# Patient Record
Sex: FEMALE | Race: WHITE | Employment: OTHER | URBAN - METROPOLITAN AREA
[De-identification: names, ages, dates, MRNs, and addresses within clinical notes are randomized per-mention and may not be internally consistent; named-entity substitution may affect disease eponyms.]

---

## 2022-02-26 ENCOUNTER — APPOINTMENT (OUTPATIENT)
Dept: GENERAL RADIOLOGY | Age: 87
DRG: 511 | End: 2022-02-26
Payer: MEDICARE

## 2022-02-26 ENCOUNTER — APPOINTMENT (OUTPATIENT)
Dept: CT IMAGING | Age: 87
DRG: 511 | End: 2022-02-26
Payer: MEDICARE

## 2022-02-26 ENCOUNTER — HOSPITAL ENCOUNTER (INPATIENT)
Age: 87
LOS: 3 days | Discharge: NURSING, REHAB OR LONG-TERM CARE | DRG: 511 | End: 2022-03-01
Attending: STUDENT IN AN ORGANIZED HEALTH CARE EDUCATION/TRAINING PROGRAM | Admitting: STUDENT IN AN ORGANIZED HEALTH CARE EDUCATION/TRAINING PROGRAM
Payer: MEDICARE

## 2022-02-26 DIAGNOSIS — S62.101B OPEN WRIST FRACTURE, RIGHT, INITIAL ENCOUNTER: Primary | ICD-10-CM

## 2022-02-26 PROBLEM — I10 HTN (HYPERTENSION): Status: ACTIVE | Noted: 2022-02-26

## 2022-02-26 PROBLEM — S62.109A WRIST FRACTURE: Status: ACTIVE | Noted: 2022-02-26

## 2022-02-26 PROBLEM — S62.101A WRIST FRACTURE, CLOSED, RIGHT, INITIAL ENCOUNTER: Status: ACTIVE | Noted: 2022-02-26

## 2022-02-26 LAB
ABO/RH: NORMAL
ANION GAP SERPL CALCULATED.3IONS-SCNC: 11 MMOL/L (ref 3–16)
ANTIBODY SCREEN: NORMAL
BASOPHILS ABSOLUTE: 0 K/UL (ref 0–0.2)
BASOPHILS RELATIVE PERCENT: 0.4 %
BILIRUBIN URINE: NEGATIVE
BLOOD, URINE: NEGATIVE
BUN BLDV-MCNC: 23 MG/DL (ref 7–20)
CALCIUM SERPL-MCNC: 9.7 MG/DL (ref 8.3–10.6)
CHLORIDE BLD-SCNC: 98 MMOL/L (ref 99–110)
CLARITY: CLEAR
CO2: 29 MMOL/L (ref 21–32)
COLOR: YELLOW
CREAT SERPL-MCNC: 0.9 MG/DL (ref 0.6–1.2)
EOSINOPHILS ABSOLUTE: 0 K/UL (ref 0–0.6)
EOSINOPHILS RELATIVE PERCENT: 0.5 %
GFR AFRICAN AMERICAN: >60
GFR NON-AFRICAN AMERICAN: 59
GLUCOSE BLD-MCNC: 129 MG/DL (ref 70–99)
GLUCOSE URINE: NEGATIVE MG/DL
HCT VFR BLD CALC: 39.4 % (ref 36–48)
HEMOGLOBIN: 12.9 G/DL (ref 12–16)
INR BLD: 1.06 (ref 0.88–1.12)
KETONES, URINE: ABNORMAL MG/DL
LEUKOCYTE ESTERASE, URINE: NEGATIVE
LYMPHOCYTES ABSOLUTE: 0.7 K/UL (ref 1–5.1)
LYMPHOCYTES RELATIVE PERCENT: 10.8 %
MCH RBC QN AUTO: 29.6 PG (ref 26–34)
MCHC RBC AUTO-ENTMCNC: 32.7 G/DL (ref 31–36)
MCV RBC AUTO: 90.8 FL (ref 80–100)
MICROSCOPIC EXAMINATION: ABNORMAL
MONOCYTES ABSOLUTE: 0.4 K/UL (ref 0–1.3)
MONOCYTES RELATIVE PERCENT: 7.2 %
NEUTROPHILS ABSOLUTE: 4.9 K/UL (ref 1.7–7.7)
NEUTROPHILS RELATIVE PERCENT: 81.1 %
NITRITE, URINE: NEGATIVE
PDW BLD-RTO: 13.2 % (ref 12.4–15.4)
PH UA: 6.5 (ref 5–8)
PLATELET # BLD: 212 K/UL (ref 135–450)
PMV BLD AUTO: 7.4 FL (ref 5–10.5)
POTASSIUM REFLEX MAGNESIUM: 3.9 MMOL/L (ref 3.5–5.1)
PROTEIN UA: NEGATIVE MG/DL
PROTHROMBIN TIME: 12 SEC (ref 9.9–12.7)
RBC # BLD: 4.34 M/UL (ref 4–5.2)
SARS-COV-2, NAAT: NOT DETECTED
SODIUM BLD-SCNC: 138 MMOL/L (ref 136–145)
SPECIFIC GRAVITY UA: 1.02 (ref 1–1.03)
URINE REFLEX TO CULTURE: ABNORMAL
URINE TYPE: ABNORMAL
UROBILINOGEN, URINE: 1 E.U./DL
WBC # BLD: 6.1 K/UL (ref 4–11)

## 2022-02-26 PROCEDURE — 81003 URINALYSIS AUTO W/O SCOPE: CPT

## 2022-02-26 PROCEDURE — 72192 CT PELVIS W/O DYE: CPT

## 2022-02-26 PROCEDURE — 36415 COLL VENOUS BLD VENIPUNCTURE: CPT

## 2022-02-26 PROCEDURE — 1200000000 HC SEMI PRIVATE

## 2022-02-26 PROCEDURE — 72125 CT NECK SPINE W/O DYE: CPT

## 2022-02-26 PROCEDURE — 80048 BASIC METABOLIC PNL TOTAL CA: CPT

## 2022-02-26 PROCEDURE — 99282 EMERGENCY DEPT VISIT SF MDM: CPT

## 2022-02-26 PROCEDURE — 86901 BLOOD TYPING SEROLOGIC RH(D): CPT

## 2022-02-26 PROCEDURE — 86850 RBC ANTIBODY SCREEN: CPT

## 2022-02-26 PROCEDURE — 96365 THER/PROPH/DIAG IV INF INIT: CPT

## 2022-02-26 PROCEDURE — 6360000002 HC RX W HCPCS: Performed by: PHYSICIAN ASSISTANT

## 2022-02-26 PROCEDURE — 2580000003 HC RX 258: Performed by: PHYSICIAN ASSISTANT

## 2022-02-26 PROCEDURE — 86900 BLOOD TYPING SEROLOGIC ABO: CPT

## 2022-02-26 PROCEDURE — 70450 CT HEAD/BRAIN W/O DYE: CPT

## 2022-02-26 PROCEDURE — 71250 CT THORAX DX C-: CPT

## 2022-02-26 PROCEDURE — 71045 X-RAY EXAM CHEST 1 VIEW: CPT

## 2022-02-26 PROCEDURE — 85025 COMPLETE CBC W/AUTO DIFF WBC: CPT

## 2022-02-26 PROCEDURE — 87635 SARS-COV-2 COVID-19 AMP PRB: CPT

## 2022-02-26 PROCEDURE — 73200 CT UPPER EXTREMITY W/O DYE: CPT

## 2022-02-26 PROCEDURE — 73110 X-RAY EXAM OF WRIST: CPT

## 2022-02-26 PROCEDURE — 93005 ELECTROCARDIOGRAM TRACING: CPT | Performed by: PHYSICIAN ASSISTANT

## 2022-02-26 PROCEDURE — 96375 TX/PRO/DX INJ NEW DRUG ADDON: CPT

## 2022-02-26 PROCEDURE — 85610 PROTHROMBIN TIME: CPT

## 2022-02-26 PROCEDURE — 73090 X-RAY EXAM OF FOREARM: CPT

## 2022-02-26 RX ORDER — METOPROLOL SUCCINATE 50 MG/1
50 TABLET, EXTENDED RELEASE ORAL DAILY
COMMUNITY

## 2022-02-26 RX ORDER — VITAMIN E 268 MG
400 CAPSULE ORAL DAILY
COMMUNITY

## 2022-02-26 RX ORDER — TRIAMTERENE AND HYDROCHLOROTHIAZIDE 37.5; 25 MG/1; MG/1
1 CAPSULE ORAL EVERY MORNING
COMMUNITY

## 2022-02-26 RX ORDER — MORPHINE SULFATE 2 MG/ML
2 INJECTION, SOLUTION INTRAMUSCULAR; INTRAVENOUS ONCE
Status: COMPLETED | OUTPATIENT
Start: 2022-02-26 | End: 2022-02-26

## 2022-02-26 RX ORDER — ONDANSETRON 2 MG/ML
4 INJECTION INTRAMUSCULAR; INTRAVENOUS ONCE
Status: COMPLETED | OUTPATIENT
Start: 2022-02-26 | End: 2022-02-26

## 2022-02-26 RX ORDER — FERROUS SULFATE 325(65) MG
325 TABLET ORAL
COMMUNITY

## 2022-02-26 RX ORDER — PSYLLIUM HUSK 0.4 G
1 CAPSULE ORAL DAILY
COMMUNITY

## 2022-02-26 RX ORDER — ASCORBIC ACID 500 MG
500 TABLET ORAL DAILY
COMMUNITY

## 2022-02-26 RX ORDER — SODIUM CHLORIDE 9 MG/ML
INJECTION, SOLUTION INTRAVENOUS CONTINUOUS
Status: ACTIVE | OUTPATIENT
Start: 2022-02-26 | End: 2022-02-27

## 2022-02-26 RX ORDER — VITAMIN B COMPLEX
1 TABLET ORAL
COMMUNITY

## 2022-02-26 RX ORDER — BRIMONIDINE TARTRATE 2 MG/ML
1 SOLUTION/ DROPS OPHTHALMIC 2 TIMES DAILY
COMMUNITY

## 2022-02-26 RX ADMIN — CEFAZOLIN SODIUM 2000 MG: 10 INJECTION, POWDER, FOR SOLUTION INTRAVENOUS at 20:42

## 2022-02-26 RX ADMIN — ONDANSETRON 4 MG: 2 INJECTION INTRAMUSCULAR; INTRAVENOUS at 19:04

## 2022-02-26 RX ADMIN — MORPHINE SULFATE 2 MG: 2 INJECTION, SOLUTION INTRAMUSCULAR; INTRAVENOUS at 19:03

## 2022-02-26 ASSESSMENT — PAIN - FUNCTIONAL ASSESSMENT: PAIN_FUNCTIONAL_ASSESSMENT: 0-10

## 2022-02-26 ASSESSMENT — PAIN SCALES - GENERAL
PAINLEVEL_OUTOF10: 5
PAINLEVEL_OUTOF10: 8

## 2022-02-26 ASSESSMENT — PAIN DESCRIPTION - ORIENTATION: ORIENTATION: RIGHT

## 2022-02-26 ASSESSMENT — ENCOUNTER SYMPTOMS
ABDOMINAL PAIN: 0
COUGH: 0
SHORTNESS OF BREATH: 0
VOMITING: 0
NAUSEA: 0
DIARRHEA: 0

## 2022-02-26 ASSESSMENT — PAIN DESCRIPTION - PAIN TYPE: TYPE: ACUTE PAIN

## 2022-02-26 ASSESSMENT — PAIN DESCRIPTION - LOCATION: LOCATION: WRIST

## 2022-02-26 ASSESSMENT — PAIN DESCRIPTION - DESCRIPTORS: DESCRIPTORS: DISCOMFORT

## 2022-02-26 NOTE — ED PROVIDER NOTES
[address]      Pt Name: Meena Mcgraw  MRN: [id number]  [place] 10/17/1930  Date of evaluation: 2/26/2022  Provider: Caryle Dixon, PA    This patient was not seen and evaluated by the attending physician No att. providers found. CHIEF COMPLAINT       Chief Complaint   Patient presents with    Wrist Injury     right wrist,        CRITICAL CARE TIME   I performed a total Critical Care time of  35 minutes, excluding separately reportable procedures. There was a high probability of clinically significant/life threatening deterioration in the patient's condition which required my urgent intervention. Not limited to multiple reexaminations, discussions with attending physician and consultants. HISTORY OF PRESENT ILLNESS  (Location/Symptom, Timing/Onset, Context/Setting, Quality, Duration, Modifying Factors, Severity.)   Meena Mcgraw is a 80 y.o. female who presents to the emergency department after a fall. She is accompanied by her daughter. The patient tells me that she was in the kitchen putting some silver back in the fridge when she lost her balance and fell onto her right side. She hit her head. Denies loss of consciousness or vomiting. She complains of right wrist pain with a wound on the pinky side of the wrist.  She had surgery on this wrist in the past, she tells me it was years ago at [facility] and is unsure who performed the surgery. She does not take any blood thinners or aspirin. Tells me she has a past medical history of hypertension and otherwise denies chronic medical problems. She tells me she has not had any illness recently denies fevers, vomiting or diarrhea. Denies pain in her shoulders or hips or neck. Nursing Notes were reviewed and I agree. REVIEW OF SYSTEMS    (2-9 systems for level 4, 10 or more for level 5)     Review of Systems   Constitutional: Negative for fever.    Respiratory: Negative for cough and shortness of breath. Cardiovascular: Negative for chest pain. Gastrointestinal: Negative for abdominal pain, diarrhea, nausea and vomiting. Musculoskeletal: Positive for arthralgias and joint swelling. Negative for neck pain. Skin: Positive for wound. Neurological: Negative for weakness, numbness and headaches. Psychiatric/Behavioral: Negative for agitation. Except as noted above the remainder of the review of systems was reviewed and negative. PAST MEDICAL HISTORY         Diagnosis Date    Hypertension        SURGICAL HISTORY           Procedure Laterality Date    FOREARM SURGERY Right 2/27/2022    RADIUS OPEN REDUCTION INTERNAL FIXATION performed by Kailey Sullivan MD at [address] [facility],[address]       Discharge Medication List as of 3/1/2022  7:00 PM      CONTINUE these medications which have NOT CHANGED    Details   brimonidine (ALPHAGAN) 0.2 % ophthalmic solution 1 drop in the morning and at bedtimeHistorical Med      metoprolol succinate (TOPROL XL) 50 MG extended release tablet Take 50 mg by mouth dailyHistorical Med      triamterene-hydroCHLOROthiazide (DYAZIDE) 37.5-25 MG per capsule Take 1 capsule by mouth every morningHistorical Med      ferrous sulfate (IRON 325) 325 (65 Fe) MG tablet Take 325 mg by mouth daily (with breakfast)Historical Med      Calcium Carb-Cholecalciferol (CALCIUM 1000 + D) 1000-800 MG-UNIT TABS Take 1 tablet by mouth dailyHistorical Med      vitamin D (CHOLECALCIFEROL) 25 MCG (1000 UT) TABS tablet Take 2,000 Units by mouth every eveningHistorical Med      vitamin C (ASCORBIC ACID) 500 MG tablet Take 500 mg by mouth dailyHistorical Med      vitamin E 400 UNIT capsule Take 400 Units by mouth dailyHistorical Med      B Complex Vitamins (B-COMPLEX/B-12) TABS Take 1 tablet by mouth every morning (before breakfast)Historical Med             ALLERGIES     Patient has no known allergies. FAMILY HISTORY     History reviewed.  No pertinent family history. No family status information on file. SOCIAL HISTORY      reports that she has never smoked. She has never used smokeless tobacco. She reports that she does not drink alcohol and does not use drugs. PHYSICAL EXAM    (up to 7 for level 4, 8 or more for level 5)     ED Triage Vitals [02/26/22 1724]   BP Temp Temp Source Pulse Resp SpO2 Height Weight   (!) 161/83 97.9 °F (36.6 °C) Oral 97 16 97 % -- --       Physical Exam  Vitals and nursing note reviewed. Constitutional:       Appearance: Normal appearance. HENT:      Head: Normocephalic and atraumatic. Mouth/Throat:      Mouth: Mucous membranes are moist.   Eyes:      Pupils: Pupils are equal, round, and reactive to light. Cardiovascular:      Rate and Rhythm: Normal rate. Pulses: Normal pulses. Pulmonary:      Effort: Pulmonary effort is normal. No respiratory distress. Abdominal:      Tenderness: There is no abdominal tenderness. Musculoskeletal:      Right forearm: Deformity, laceration, tenderness and bony tenderness present. Hands:       Cervical back: Normal range of motion. No tenderness. Skin:     General: Skin is warm. Neurological:      General: No focal deficit present. Mental Status: She is alert and oriented to person, place, and time. Psychiatric:         Mood and Affect: Mood normal.         Behavior: Behavior normal.         DIAGNOSTIC RESULTS     EKG: All EKG's are interpreted by DAO Louis in the absence of a cardiologist.    EKG interpreted by myself - please refer to attending physician's note for complete EKG interpretation:    No evidence of acute ischemia or injury. RADIOLOGY:   Non-plain film images such as CT, Ultrasound and MRI are read by the radiologist. Plain radiographic images are visualized and preliminarily interpreted by DAO Louis with the below findings:    Reviewed radiologist's interpretation.      Interpretation per the Radiologist below, if available at the time of this note:    XR WRIST RIGHT (MIN 3 VIEWS)   Final Result      FLUORO FOR SURGICAL PROCEDURES   Final Result      CT CHEST WO CONTRAST   Final Result   No acute abnormality identified. The opacity seen in the right perihilar region is most likely secondary to   superimposition of dense breast parenchyma with the right hilum. Small noncalcified nodules are identified within the very anterior aspect of   the right lower lobe. No routine follow-up is recommended based upon the   most recent recommendations. See full recommendations below. RECOMMENDATIONS:   Multiple pulmonary nodules. Most severe: 5 mm right solid pulmonary nodule. No routine follow-up imaging is recommended per Fleischner Society Guidelines. These guidelines do not apply to immunocompromised patients and patients with   cancer. Follow up in patients with significant comorbidities as clinically   warranted. For lung cancer screening, adhere to Lung-RADS guidelines. Reference: Radiology. 2017; 284(1):228-43. XR WRIST RIGHT (MIN 3 VIEWS)   Final Result   Interval partial reduction of the distal right radial and ulnar fractures. XR CHEST 1 VIEW   Final Result   Right perihilar opacity. Correlate with presentation. CT WRIST RIGHT WO CONTRAST   Final Result   1. Acute and displaced fractures of the distal radius and distal ulna with   impaction at the fracture site and pronounced malalignment of the distal   ulnar fracture and distal radioulnar joint. 2. Soft tissue edema and subcutaneous gas compatible with open fracture. 3. Severe osteopenia. 4. 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   5. Chondrocalcinosis. CT HEAD WO CONTRAST   Final Result   No acute intracranial abnormality. Specifically, no acute intracranial   hemorrhage or mass effect. Chronic small vessel ischemic disease.          CT PELVIS WO CONTRAST Additional Contrast? None   Final Result   No acute fracture is identified with the pelvis and within the hips. Amorphous calcification adjacent the right greater trochanter, which raise   the possibility of calcium hydroxyapatite deposition, which can be a   generator of pain. CT CERVICAL SPINE WO CONTRAST   Final Result   No acute fracture or traumatic malalignment. Basilar invagination of the dens, with some erosions seen at the atlantoaxial   joint, which raises the possibility of an erosive arthropathy. XR RADIUS ULNA RIGHT (2 VIEWS)   Final Result   Acute displaced distal radius and ulnar fractures.                LABS:  Labs Reviewed   CBC WITH AUTO DIFFERENTIAL - Abnormal; Notable for the following components:       Result Value    Lymphocytes Absolute 0.7 (*)     All other components within normal limits    Narrative:     Performed at:  [address] [facility]   Phone [phone]   BASIC METABOLIC PANEL W/ REFLEX TO MG FOR LOW K - Abnormal; Notable for the following components:    Chloride 98 (*)     Glucose 129 (*)     BUN 23 (*)     GFR Non- 59 (*)     All other components within normal limits    Narrative:     Performed at:  [address] [place] [address]   Phone [phone]   URINALYSIS WITH REFLEX TO CULTURE - Abnormal; Notable for the following components:    Ketones, Urine TRACE (*)     All other components within normal limits    Narrative:     Performed at:  [address] [place] [address]   Phone [phone]   CBC WITH AUTO DIFFERENTIAL - Abnormal; Notable for the following components:    RBC 3.95 (*)     Hemoglobin 11.8 (*)     Hematocrit 35.9 (*)     All other components within normal limits    Narrative:     Performed at:  [address] [place] [address]   Phone [phone]   BASIC METABOLIC PANEL - Abnormal; Notable for the following components:    Glucose 110 (*)     All other components within normal limits    Narrative:     Performed at:  [address] [place]   Phone [phone]   MAGNESIUM - Abnormal; Notable for the following components:    Magnesium 1.40 (*)     All other components within normal limits    Narrative:     Performed at:  [address] [place] [address]   Phone [phone]   CBC WITH AUTO DIFFERENTIAL - Abnormal; Notable for the following components:    Hemoglobin 11.1 (*)     Hematocrit 33.8 (*)     RBC 3.71 (*)     All other components within normal limits    Narrative:     Performed at:  [address] [place]   Phone [phone]   BASIC METABOLIC PANEL - Abnormal; Notable for the following components:    Sodium 133 (*)     Chloride 98 (*)     Glucose 102 (*)     All other components within normal limits    Narrative:     Performed at:  [address] [place]   Phone [phone]   CBC WITH AUTO DIFFERENTIAL - Abnormal; Notable for the following components:    RBC 3.74 (*)     Hemoglobin 11.2 (*)     Hematocrit 33.9 (*)     All other components within normal limits    Narrative:     Performed at:  [address] [facility] [place]   Phone [phone]   BASIC METABOLIC PANEL - Abnormal; Notable for the following components:    Sodium 132 (*)     Chloride 96 (*)     Glucose 101 (*)     All other components within normal limits    Narrative:     Performed at:  [address] [facility] [place]   Phone [phone] RAPID    Narrative:     Performed at:  [facility] [address] [facility]  [address]  [place], [place] [address]   Phone [phone], RAPID    Narrative:     Performed at:  [address] [facility]  [address]  [place] [address]   Phone [phone]   PROTIME-INR    Narrative:     Performed at:  [address] [facility]  [address] [facility] [place], [place] [address]   Phone [phone]   MAGNESIUM    Narrative:     Performed at:  [address] [facility]  [address] [facility] [place], [place] [address]   Phone [phone]   MAGNESIUM    Narrative:     Performed at:  [address] [facility]  [address] [facility] [place] [facility] [place] [address]   Phone [phone]   TYPE AND SCREEN    Narrative:     Performed at:  [address] [facility]  [address] [facility] [place], [place] [address]   Phone [phone]       All other labs were within normal range or not returned as of this dictation. EMERGENCY DEPARTMENT COURSE and DIFFERENTIAL DIAGNOSIS/MDM:   Vitals:    Vitals:    02/28/22 1646 02/28/22 1921 03/01/22 0703 03/01/22 1419   BP: (!) 144/71 (!) 142/70  (!) 143/78   Pulse: 108 96  99   Resp: 18 17  18   Temp: 100 °F (37.8 °C) 99.4 °F (37.4 °C)  99.2 °F (37.3 °C)   TempSrc: Oral Oral  Oral   SpO2: 94% 95%  97%   Weight:   95 lb 0.3 oz (43.1 kg)    Height:         Patient is afebrile not tachycardic not hypoxic blood pressure elevated 797 systolic. She has a quarter sized wound on the ulnar aspect of the right wrist over fracture fragment. It was cleaned irrigated with some normal saline and a wet dressing of Betadine was applied per recommendation of orthopedics. I spoke with both Dr. Dara Baxter and Dr. Jovanny Steel. She will have surgery at 10:30 AM.    CONSULTS:  IP CONSULT TO ORTHOPEDIC SURGERY  IP CONSULT TO ORTHOPEDIC SURGERY  IP CONSULT TO SPIRITUAL SERVICES  IP CONSULT TO SOCIAL WORK    PROCEDURES:  Procedures      FINAL IMPRESSION      1.  Open

## 2022-02-27 ENCOUNTER — APPOINTMENT (OUTPATIENT)
Dept: GENERAL RADIOLOGY | Age: 87
DRG: 511 | End: 2022-02-27
Payer: MEDICARE

## 2022-02-27 ENCOUNTER — ANESTHESIA (OUTPATIENT)
Dept: OPERATING ROOM | Age: 87
DRG: 511 | End: 2022-02-27
Payer: MEDICARE

## 2022-02-27 ENCOUNTER — ANESTHESIA EVENT (OUTPATIENT)
Dept: OPERATING ROOM | Age: 87
DRG: 511 | End: 2022-02-27
Payer: MEDICARE

## 2022-02-27 VITALS
OXYGEN SATURATION: 100 % | SYSTOLIC BLOOD PRESSURE: 140 MMHG | DIASTOLIC BLOOD PRESSURE: 73 MMHG | RESPIRATION RATE: 19 BRPM

## 2022-02-27 LAB
ANION GAP SERPL CALCULATED.3IONS-SCNC: 10 MMOL/L (ref 3–16)
BASOPHILS ABSOLUTE: 0 K/UL (ref 0–0.2)
BASOPHILS RELATIVE PERCENT: 0.5 %
BUN BLDV-MCNC: 18 MG/DL (ref 7–20)
CALCIUM SERPL-MCNC: 8.8 MG/DL (ref 8.3–10.6)
CHLORIDE BLD-SCNC: 101 MMOL/L (ref 99–110)
CO2: 26 MMOL/L (ref 21–32)
CREAT SERPL-MCNC: 0.7 MG/DL (ref 0.6–1.2)
EKG ATRIAL RATE: 97 BPM
EKG DIAGNOSIS: NORMAL
EKG P AXIS: 86 DEGREES
EKG P-R INTERVAL: 176 MS
EKG Q-T INTERVAL: 344 MS
EKG QRS DURATION: 70 MS
EKG QTC CALCULATION (BAZETT): 436 MS
EKG R AXIS: 55 DEGREES
EKG T AXIS: 80 DEGREES
EKG VENTRICULAR RATE: 97 BPM
EOSINOPHILS ABSOLUTE: 0 K/UL (ref 0–0.6)
EOSINOPHILS RELATIVE PERCENT: 1 %
GFR AFRICAN AMERICAN: >60
GFR NON-AFRICAN AMERICAN: >60
GLUCOSE BLD-MCNC: 110 MG/DL (ref 70–99)
HCT VFR BLD CALC: 35.9 % (ref 36–48)
HEMOGLOBIN: 11.8 G/DL (ref 12–16)
LYMPHOCYTES ABSOLUTE: 1 K/UL (ref 1–5.1)
LYMPHOCYTES RELATIVE PERCENT: 21.6 %
MAGNESIUM: 1.4 MG/DL (ref 1.8–2.4)
MCH RBC QN AUTO: 29.8 PG (ref 26–34)
MCHC RBC AUTO-ENTMCNC: 32.8 G/DL (ref 31–36)
MCV RBC AUTO: 90.7 FL (ref 80–100)
MONOCYTES ABSOLUTE: 0.4 K/UL (ref 0–1.3)
MONOCYTES RELATIVE PERCENT: 8.4 %
NEUTROPHILS ABSOLUTE: 3.3 K/UL (ref 1.7–7.7)
NEUTROPHILS RELATIVE PERCENT: 68.5 %
PDW BLD-RTO: 13.1 % (ref 12.4–15.4)
PLATELET # BLD: 192 K/UL (ref 135–450)
PMV BLD AUTO: 7.4 FL (ref 5–10.5)
POTASSIUM SERPL-SCNC: 4 MMOL/L (ref 3.5–5.1)
RBC # BLD: 3.95 M/UL (ref 4–5.2)
SODIUM BLD-SCNC: 137 MMOL/L (ref 136–145)
WBC # BLD: 4.8 K/UL (ref 4–11)

## 2022-02-27 PROCEDURE — 3209999900 FLUORO FOR SURGICAL PROCEDURES

## 2022-02-27 PROCEDURE — 83735 ASSAY OF MAGNESIUM: CPT

## 2022-02-27 PROCEDURE — 93010 ELECTROCARDIOGRAM REPORT: CPT | Performed by: INTERNAL MEDICINE

## 2022-02-27 PROCEDURE — A4217 STERILE WATER/SALINE, 500 ML: HCPCS | Performed by: ORTHOPAEDIC SURGERY

## 2022-02-27 PROCEDURE — 2580000003 HC RX 258: Performed by: ORTHOPAEDIC SURGERY

## 2022-02-27 PROCEDURE — 85025 COMPLETE CBC W/AUTO DIFF WBC: CPT

## 2022-02-27 PROCEDURE — 2709999900 HC NON-CHARGEABLE SUPPLY: Performed by: ORTHOPAEDIC SURGERY

## 2022-02-27 PROCEDURE — C1769 GUIDE WIRE: HCPCS | Performed by: ORTHOPAEDIC SURGERY

## 2022-02-27 PROCEDURE — 36415 COLL VENOUS BLD VENIPUNCTURE: CPT

## 2022-02-27 PROCEDURE — 7100000000 HC PACU RECOVERY - FIRST 15 MIN: Performed by: ORTHOPAEDIC SURGERY

## 2022-02-27 PROCEDURE — 3600000014 HC SURGERY LEVEL 4 ADDTL 15MIN: Performed by: ORTHOPAEDIC SURGERY

## 2022-02-27 PROCEDURE — 1200000000 HC SEMI PRIVATE

## 2022-02-27 PROCEDURE — 2580000003 HC RX 258: Performed by: STUDENT IN AN ORGANIZED HEALTH CARE EDUCATION/TRAINING PROGRAM

## 2022-02-27 PROCEDURE — 99222 1ST HOSP IP/OBS MODERATE 55: CPT | Performed by: ORTHOPAEDIC SURGERY

## 2022-02-27 PROCEDURE — C1713 ANCHOR/SCREW BN/BN,TIS/BN: HCPCS | Performed by: ORTHOPAEDIC SURGERY

## 2022-02-27 PROCEDURE — 80048 BASIC METABOLIC PNL TOTAL CA: CPT

## 2022-02-27 PROCEDURE — 94150 VITAL CAPACITY TEST: CPT

## 2022-02-27 PROCEDURE — 3700000001 HC ADD 15 MINUTES (ANESTHESIA): Performed by: ORTHOPAEDIC SURGERY

## 2022-02-27 PROCEDURE — 73110 X-RAY EXAM OF WRIST: CPT

## 2022-02-27 PROCEDURE — 6360000002 HC RX W HCPCS: Performed by: ORTHOPAEDIC SURGERY

## 2022-02-27 PROCEDURE — 2500000003 HC RX 250 WO HCPCS: Performed by: ORTHOPAEDIC SURGERY

## 2022-02-27 PROCEDURE — 6360000002 HC RX W HCPCS: Performed by: STUDENT IN AN ORGANIZED HEALTH CARE EDUCATION/TRAINING PROGRAM

## 2022-02-27 PROCEDURE — 25525 OPTX RDL SHFT FX&CLTX RAD/UL: CPT | Performed by: ORTHOPAEDIC SURGERY

## 2022-02-27 PROCEDURE — 3600000004 HC SURGERY LEVEL 4 BASE: Performed by: ORTHOPAEDIC SURGERY

## 2022-02-27 PROCEDURE — 2720000010 HC SURG SUPPLY STERILE: Performed by: ORTHOPAEDIC SURGERY

## 2022-02-27 PROCEDURE — 3700000000 HC ANESTHESIA ATTENDED CARE: Performed by: ORTHOPAEDIC SURGERY

## 2022-02-27 PROCEDURE — 6370000000 HC RX 637 (ALT 250 FOR IP): Performed by: ORTHOPAEDIC SURGERY

## 2022-02-27 PROCEDURE — 6360000002 HC RX W HCPCS: Performed by: ANESTHESIOLOGY

## 2022-02-27 PROCEDURE — 0PSH04Z REPOSITION RIGHT RADIUS WITH INTERNAL FIXATION DEVICE, OPEN APPROACH: ICD-10-PCS | Performed by: ORTHOPAEDIC SURGERY

## 2022-02-27 PROCEDURE — 11012 DEB SKIN BONE AT FX SITE: CPT | Performed by: ORTHOPAEDIC SURGERY

## 2022-02-27 PROCEDURE — 2500000003 HC RX 250 WO HCPCS: Performed by: ANESTHESIOLOGY

## 2022-02-27 PROCEDURE — 7100000001 HC PACU RECOVERY - ADDTL 15 MIN: Performed by: ORTHOPAEDIC SURGERY

## 2022-02-27 DEVICE — LOCKING SCREW, FULLY THREADED,T8
Type: IMPLANTABLE DEVICE | Site: WRIST | Status: FUNCTIONAL
Brand: VARIAX

## 2022-02-27 DEVICE — BONE SCREW, FULLY THREADED, T8
Type: IMPLANTABLE DEVICE | Site: WRIST | Status: FUNCTIONAL
Brand: VARIAX

## 2022-02-27 DEVICE — BROAD LOCKING PLATE, 2.7
Type: IMPLANTABLE DEVICE | Site: WRIST | Status: FUNCTIONAL
Brand: VARIAX

## 2022-02-27 RX ORDER — MEPERIDINE HYDROCHLORIDE 25 MG/ML
12.5 INJECTION INTRAMUSCULAR; INTRAVENOUS; SUBCUTANEOUS EVERY 5 MIN PRN
Status: DISCONTINUED | OUTPATIENT
Start: 2022-02-27 | End: 2022-02-27 | Stop reason: HOSPADM

## 2022-02-27 RX ORDER — DEXAMETHASONE SODIUM PHOSPHATE 4 MG/ML
INJECTION, SOLUTION INTRA-ARTICULAR; INTRALESIONAL; INTRAMUSCULAR; INTRAVENOUS; SOFT TISSUE PRN
Status: DISCONTINUED | OUTPATIENT
Start: 2022-02-27 | End: 2022-02-27 | Stop reason: SDUPTHER

## 2022-02-27 RX ORDER — SODIUM CHLORIDE 0.9 % (FLUSH) 0.9 %
5-40 SYRINGE (ML) INJECTION PRN
Status: DISCONTINUED | OUTPATIENT
Start: 2022-02-27 | End: 2022-02-27 | Stop reason: SDUPTHER

## 2022-02-27 RX ORDER — SODIUM CHLORIDE 9 MG/ML
25 INJECTION, SOLUTION INTRAVENOUS PRN
Status: DISCONTINUED | OUTPATIENT
Start: 2022-02-27 | End: 2022-02-27 | Stop reason: SDUPTHER

## 2022-02-27 RX ORDER — SODIUM CHLORIDE 0.9 % (FLUSH) 0.9 %
5-40 SYRINGE (ML) INJECTION PRN
Status: DISCONTINUED | OUTPATIENT
Start: 2022-02-27 | End: 2022-03-01 | Stop reason: HOSPADM

## 2022-02-27 RX ORDER — OXYCODONE HYDROCHLORIDE 5 MG/1
5 TABLET ORAL PRN
Status: DISCONTINUED | OUTPATIENT
Start: 2022-02-27 | End: 2022-02-27 | Stop reason: HOSPADM

## 2022-02-27 RX ORDER — ONDANSETRON 2 MG/ML
4 INJECTION INTRAMUSCULAR; INTRAVENOUS EVERY 6 HOURS PRN
Status: DISCONTINUED | OUTPATIENT
Start: 2022-02-27 | End: 2022-03-01 | Stop reason: HOSPADM

## 2022-02-27 RX ORDER — PROPOFOL 10 MG/ML
INJECTION, EMULSION INTRAVENOUS PRN
Status: DISCONTINUED | OUTPATIENT
Start: 2022-02-27 | End: 2022-02-27 | Stop reason: SDUPTHER

## 2022-02-27 RX ORDER — DIPHENHYDRAMINE HYDROCHLORIDE 50 MG/ML
12.5 INJECTION INTRAMUSCULAR; INTRAVENOUS
Status: DISCONTINUED | OUTPATIENT
Start: 2022-02-27 | End: 2022-02-27 | Stop reason: HOSPADM

## 2022-02-27 RX ORDER — FENTANYL CITRATE 50 UG/ML
INJECTION, SOLUTION INTRAMUSCULAR; INTRAVENOUS PRN
Status: DISCONTINUED | OUTPATIENT
Start: 2022-02-27 | End: 2022-02-27 | Stop reason: SDUPTHER

## 2022-02-27 RX ORDER — SODIUM CHLORIDE 9 MG/ML
25 INJECTION, SOLUTION INTRAVENOUS PRN
Status: DISCONTINUED | OUTPATIENT
Start: 2022-02-27 | End: 2022-03-01 | Stop reason: HOSPADM

## 2022-02-27 RX ORDER — ONDANSETRON 2 MG/ML
INJECTION INTRAMUSCULAR; INTRAVENOUS PRN
Status: DISCONTINUED | OUTPATIENT
Start: 2022-02-27 | End: 2022-02-27 | Stop reason: SDUPTHER

## 2022-02-27 RX ORDER — SODIUM CHLORIDE 0.9 % (FLUSH) 0.9 %
5-40 SYRINGE (ML) INJECTION EVERY 12 HOURS SCHEDULED
Status: DISCONTINUED | OUTPATIENT
Start: 2022-02-27 | End: 2022-03-01 | Stop reason: HOSPADM

## 2022-02-27 RX ORDER — ONDANSETRON 2 MG/ML
4 INJECTION INTRAMUSCULAR; INTRAVENOUS
Status: DISCONTINUED | OUTPATIENT
Start: 2022-02-27 | End: 2022-02-27 | Stop reason: HOSPADM

## 2022-02-27 RX ORDER — MORPHINE SULFATE 2 MG/ML
2 INJECTION, SOLUTION INTRAMUSCULAR; INTRAVENOUS EVERY 4 HOURS PRN
Status: DISCONTINUED | OUTPATIENT
Start: 2022-02-27 | End: 2022-03-01 | Stop reason: HOSPADM

## 2022-02-27 RX ORDER — FENTANYL CITRATE 50 UG/ML
25 INJECTION, SOLUTION INTRAMUSCULAR; INTRAVENOUS EVERY 5 MIN PRN
Status: DISCONTINUED | OUTPATIENT
Start: 2022-02-27 | End: 2022-02-27 | Stop reason: HOSPADM

## 2022-02-27 RX ORDER — SODIUM CHLORIDE 450 MG/100ML
INJECTION, SOLUTION INTRAVENOUS CONTINUOUS
Status: DISCONTINUED | OUTPATIENT
Start: 2022-02-27 | End: 2022-03-01 | Stop reason: HOSPADM

## 2022-02-27 RX ORDER — BUPIVACAINE HYDROCHLORIDE 5 MG/ML
INJECTION, SOLUTION EPIDURAL; INTRACAUDAL
Status: COMPLETED | OUTPATIENT
Start: 2022-02-27 | End: 2022-02-27

## 2022-02-27 RX ORDER — SODIUM CHLORIDE 0.9 % (FLUSH) 0.9 %
5-40 SYRINGE (ML) INJECTION EVERY 12 HOURS SCHEDULED
Status: DISCONTINUED | OUTPATIENT
Start: 2022-02-27 | End: 2022-02-27 | Stop reason: HOSPADM

## 2022-02-27 RX ORDER — SODIUM CHLORIDE 0.9 % (FLUSH) 0.9 %
5-40 SYRINGE (ML) INJECTION PRN
Status: DISCONTINUED | OUTPATIENT
Start: 2022-02-27 | End: 2022-02-27 | Stop reason: HOSPADM

## 2022-02-27 RX ORDER — OXYCODONE HYDROCHLORIDE 10 MG/1
10 TABLET ORAL PRN
Status: DISCONTINUED | OUTPATIENT
Start: 2022-02-27 | End: 2022-02-27 | Stop reason: HOSPADM

## 2022-02-27 RX ORDER — LIDOCAINE HYDROCHLORIDE 20 MG/ML
INJECTION, SOLUTION EPIDURAL; INFILTRATION; INTRACAUDAL; PERINEURAL PRN
Status: DISCONTINUED | OUTPATIENT
Start: 2022-02-27 | End: 2022-02-27 | Stop reason: SDUPTHER

## 2022-02-27 RX ORDER — SODIUM CHLORIDE 9 MG/ML
25 INJECTION, SOLUTION INTRAVENOUS PRN
Status: DISCONTINUED | OUTPATIENT
Start: 2022-02-27 | End: 2022-02-27 | Stop reason: HOSPADM

## 2022-02-27 RX ORDER — ACETAMINOPHEN 325 MG/1
650 TABLET ORAL EVERY 6 HOURS PRN
Status: DISCONTINUED | OUTPATIENT
Start: 2022-02-27 | End: 2022-03-01 | Stop reason: HOSPADM

## 2022-02-27 RX ORDER — SODIUM CHLORIDE 0.9 % (FLUSH) 0.9 %
5-40 SYRINGE (ML) INJECTION EVERY 12 HOURS SCHEDULED
Status: DISCONTINUED | OUTPATIENT
Start: 2022-02-27 | End: 2022-02-27 | Stop reason: SDUPTHER

## 2022-02-27 RX ORDER — ACETAMINOPHEN 650 MG/1
650 SUPPOSITORY RECTAL EVERY 6 HOURS PRN
Status: DISCONTINUED | OUTPATIENT
Start: 2022-02-27 | End: 2022-03-01 | Stop reason: HOSPADM

## 2022-02-27 RX ORDER — MAGNESIUM SULFATE IN WATER 40 MG/ML
4000 INJECTION, SOLUTION INTRAVENOUS ONCE
Status: COMPLETED | OUTPATIENT
Start: 2022-02-27 | End: 2022-02-27

## 2022-02-27 RX ADMIN — FENTANYL CITRATE 25 MCG: 50 INJECTION, SOLUTION INTRAMUSCULAR; INTRAVENOUS at 11:17

## 2022-02-27 RX ADMIN — CEFAZOLIN 2000 MG: 10 INJECTION, POWDER, FOR SOLUTION INTRAVENOUS at 23:42

## 2022-02-27 RX ADMIN — CEFAZOLIN 2000 MG: 10 INJECTION, POWDER, FOR SOLUTION INTRAVENOUS at 17:45

## 2022-02-27 RX ADMIN — LIDOCAINE HYDROCHLORIDE 60 MG: 20 INJECTION, SOLUTION EPIDURAL; INFILTRATION; INTRACAUDAL; PERINEURAL at 10:59

## 2022-02-27 RX ADMIN — PROPOFOL 20 MG: 10 INJECTION, EMULSION INTRAVENOUS at 11:00

## 2022-02-27 RX ADMIN — SODIUM CHLORIDE: 4.5 INJECTION, SOLUTION INTRAVENOUS at 13:31

## 2022-02-27 RX ADMIN — SODIUM CHLORIDE: 9 INJECTION, SOLUTION INTRAVENOUS at 01:15

## 2022-02-27 RX ADMIN — Medication 10 ML: at 21:04

## 2022-02-27 RX ADMIN — MAGNESIUM SULFATE HEPTAHYDRATE 4000 MG: 40 INJECTION, SOLUTION INTRAVENOUS at 07:22

## 2022-02-27 RX ADMIN — SODIUM CHLORIDE: 9 INJECTION, SOLUTION INTRAVENOUS at 12:25

## 2022-02-27 RX ADMIN — FENTANYL CITRATE 25 MCG: 50 INJECTION, SOLUTION INTRAMUSCULAR; INTRAVENOUS at 11:12

## 2022-02-27 RX ADMIN — ONDANSETRON 2 MG: 2 INJECTION INTRAMUSCULAR; INTRAVENOUS at 12:10

## 2022-02-27 RX ADMIN — PROPOFOL 40 MG: 10 INJECTION, EMULSION INTRAVENOUS at 10:59

## 2022-02-27 RX ADMIN — FENTANYL CITRATE 25 MCG: 50 INJECTION, SOLUTION INTRAMUSCULAR; INTRAVENOUS at 11:07

## 2022-02-27 RX ADMIN — DEXAMETHASONE SODIUM PHOSPHATE 4 MG: 4 INJECTION, SOLUTION INTRAMUSCULAR; INTRAVENOUS at 11:05

## 2022-02-27 RX ADMIN — FENTANYL CITRATE 25 MCG: 50 INJECTION, SOLUTION INTRAMUSCULAR; INTRAVENOUS at 10:59

## 2022-02-27 RX ADMIN — ACETAMINOPHEN 650 MG: 325 TABLET ORAL at 13:34

## 2022-02-27 RX ADMIN — CEFAZOLIN 2000 MG: 10 INJECTION, POWDER, FOR SOLUTION INTRAVENOUS at 06:04

## 2022-02-27 RX ADMIN — ONDANSETRON 2 MG: 2 INJECTION INTRAMUSCULAR; INTRAVENOUS at 11:05

## 2022-02-27 ASSESSMENT — PULMONARY FUNCTION TESTS
PIF_VALUE: 2
PIF_VALUE: 3
PIF_VALUE: 2
PIF_VALUE: 1
PIF_VALUE: 2
PIF_VALUE: 3
PIF_VALUE: 2
PIF_VALUE: 3
PIF_VALUE: 0
PIF_VALUE: 2
PIF_VALUE: 3
PIF_VALUE: 2
PIF_VALUE: 3
PIF_VALUE: 2
PIF_VALUE: 3
PIF_VALUE: 2
PIF_VALUE: 0
PIF_VALUE: 1
PIF_VALUE: 1
PIF_VALUE: 2
PIF_VALUE: 3
PIF_VALUE: 2
PIF_VALUE: 2
PIF_VALUE: 4
PIF_VALUE: 2
PIF_VALUE: 3
PIF_VALUE: 2
PIF_VALUE: 0
PIF_VALUE: 2

## 2022-02-27 ASSESSMENT — PAIN DESCRIPTION - DESCRIPTORS
DESCRIPTORS: ACHING
DESCRIPTORS: HEAVINESS;ACHING
DESCRIPTORS: ACHING

## 2022-02-27 ASSESSMENT — PAIN SCALES - GENERAL
PAINLEVEL_OUTOF10: 0
PAINLEVEL_OUTOF10: 3
PAINLEVEL_OUTOF10: 2

## 2022-02-27 ASSESSMENT — PAIN - FUNCTIONAL ASSESSMENT
PAIN_FUNCTIONAL_ASSESSMENT: PREVENTS OR INTERFERES SOME ACTIVE ACTIVITIES AND ADLS
PAIN_FUNCTIONAL_ASSESSMENT: 0-10
PAIN_FUNCTIONAL_ASSESSMENT: PREVENTS OR INTERFERES SOME ACTIVE ACTIVITIES AND ADLS

## 2022-02-27 ASSESSMENT — PAIN DESCRIPTION - ONSET
ONSET: GRADUAL
ONSET: ON-GOING

## 2022-02-27 ASSESSMENT — PAIN DESCRIPTION - PAIN TYPE
TYPE: SURGICAL PAIN
TYPE: CHRONIC PAIN

## 2022-02-27 ASSESSMENT — PAIN DESCRIPTION - PROGRESSION
CLINICAL_PROGRESSION: NOT CHANGED
CLINICAL_PROGRESSION: NOT CHANGED

## 2022-02-27 ASSESSMENT — PAIN DESCRIPTION - LOCATION
LOCATION: ARM;WRIST
LOCATION: KNEE

## 2022-02-27 ASSESSMENT — PAIN DESCRIPTION - ORIENTATION
ORIENTATION: RIGHT
ORIENTATION: RIGHT

## 2022-02-27 ASSESSMENT — PAIN DESCRIPTION - FREQUENCY
FREQUENCY: CONTINUOUS
FREQUENCY: CONTINUOUS

## 2022-02-27 NOTE — BRIEF OP NOTE
Brief Postoperative Note      Patient: Mayelin Hdez  YOB: 1930  MRN: [id number]    Date of Procedure: 2/27/2022    Pre-Op Diagnosis: OPEN FRACTURE RIGHT DISTAL RADIUS PERIPROSTHETIC GALEAZZI FRACTURE DISLOCATION. Post-Op Diagnosis: Same       Procedure(s):  RADIUS OPEN REDUCTION INTERNAL FIXATION RIGHT DISTAL RADIUS PERIPROSTHETIC GALEAZZI FRACTURE DISLOCATION AND DÉBRIDEMENT OPEN FRACTURE. Surgeon(s):  Vito Sloan MD    Assistant:  Surgical Assistant: Anna West    Anesthesia: General    Estimated Blood Loss (mL): Minimal    Complications: None    Specimens:   * No specimens in log *    Implants:  Implant Name Type Inv.  Item Serial No.  Lot No. LRB No. Used Action   PLATE BNE LCK BROAD 2.7 MM 8 HOLE NS VARIAX - [id number]  PLATE BNE LCK BROAD 2.7 MM 8 HOLE NS VARIAX  [employer] [facility]  Right 1 Implanted   SCREW BNE L12MM DIA2.7MM WRST TI ALLY NONLOCKING FULL THRD - [id number]  SCREW BNE L12MM DIA2.7MM WRST TI ALLY NONLOCKING FULL THRD  [employer] [facility]  Right 1 Implanted   SCREW BONE L14MM DIA2.7MM WR TI ALLOY NONLOCKING FULL THRD - [id number]  SCREW BONE L14MM DIA2.7MM WR TI ALLOY NONLOCKING FULL THRD  [employer] [facility]  Right 1 Implanted   SCREW BNE L12MM DIA2.7MM WRST TI ALLY JASBIR FULL THRD T8 [facility] - [id number]  SCREW BNE L12MM DIA2.7MM WRST TI ALLY JASBIR FULL THRD T8 [facility]  Right 2 Implanted   SCREW BONE L14MM DIA2.7MM WR TI ALLOY LCK FULL THRD T8 [facility] - [id number]  SCREW BONE L14MM DIA2.7MM WR TI ALLOY LCK FULL THRD T8 [facility]  Right 3 Implanted   SCREW BNE L10MM DIA2.7MM WRST TI ALLY JASBIR FULL THRD T8 [facility] - [id number]  SCREW BNE L10MM DIA2.7MM WRST TI ALLY JASBIR FULL THRD T8 [facility]  Right 1 Implanted         Drains:   Urethral Catheter Straight-tip 18 fr (Active)   Catheter Indications Perioperative use for selected surgical procedures 02/27/22 1314   Site Assessment No urethral drainage 02/27/22 1314   Urine Color Yellow 02/27/22 1314   Urine Appearance Clear 02/27/22 1314   Output (mL) 600 mL 02/27/22 0559       Findings: Same.     Electronically signed by Enid Torres MD on 2/27/2022 at 2:25 PM

## 2022-02-27 NOTE — PROGRESS NOTES
Pt arrived to PACU from OR. Pt asleep on 2l/nc. Right arm with splint and ace wrap C/D/I. Fingers warm. Cap refill WNL.

## 2022-02-27 NOTE — PROGRESS NOTES
Checking on patient Q2H for nutrition needs, hygiene needs, comfort measures, mobility, fall risk interventions, and safe environment. All precautions and interventions in place. Educated patient on use of call light and telephone. Patient verbalizes understanding. Call light/telephone in reach.   Electronically signed by Enid Hartman RN on 2/27/2022 at 7:44 AM

## 2022-02-27 NOTE — PLAN OF CARE
Problem: Pain:  Goal: Pain level will decrease  Description: Pain level will decrease  Outcome: Ongoing  Note: Pt educated to attempt non-phagological method of pain control, but it it becomes too strong use PRN analgesics. Pain and discomfort being managed PRN analgesics per MD orders. Pt able to express presence of pain. Problem: Pain:  Goal: Control of acute pain  Description: Control of acute pain  Outcome: Ongoing  Note: Patient educated on acute pain. Taught patient to use call light to ask for pain medication. PRN pain medication given for acute pain. Will continue to monitor pain per unit protocol. Problem: Pain:  Goal: Control of chronic pain  Description: Control of chronic pain  Outcome: Ongoing  Note: Patient educated on chronic pain. Taught patient to use call light to ask for pain medication. PRN pain medication given for chronic pain. Will continue to monitor pain per unit protocol. Problem: Falls - Risk of:  Goal: Will remain free from falls  Description: Will remain free from falls  Outcome: Ongoing  Note: Fall risk assessment completed . Fall precautions in place, bed/ chair alarm on, side rails 2/4 up, call light in reach, educated pt on calling for assistance when needed, room clear of clutter. Pt verbalized understanding. Problem: Falls - Risk of:  Goal: Will remain free from falls  Description: Will remain free from falls  Outcome: Ongoing  Note: Fall risk assessment completed . Fall precautions in place, bed/ chair alarm on, side rails 2/4 up, call light in reach, educated pt on calling for assistance when needed, room clear of clutter. Pt verbalized understanding. Problem: Falls - Risk of:  Goal: Absence of physical injury  Description: Absence of physical injury  Outcome: Ongoing  Note: Patient remains free from physical injury. Patient educated on safety precautions.  Will continue to monitor to ensure patient remains free from physical injury throughout remainder of shift. Problem: Skin Integrity:  Goal: Will show no infection signs and symptoms  Description: Will show no infection signs and symptoms  Outcome: Ongoing  Note: Patient shows no signs or symptoms of urinary tract infection at this time. Will continue to monitor throughout shift. Problem: Skin Integrity:  Goal: Absence of new skin breakdown  Description: Absence of new skin breakdown  Outcome: Ongoing  Note: Skin assessment complete. No new signs of skin breakdown noted. Repositioning patient with pillows at two hour intervals. Heels elevated off bed.

## 2022-02-27 NOTE — CONSULTS
[id number] [facility] Orthopedic Surgery  Consult Note         This patient is seen in consultation at the request of Dr Saintclair Donalds, DO and DAO Colvin    Reason for Consult:  Right wrist pain/ markedly displaced distal radius periprosthetic G II open fracture. CHIEF COMPLAINT:  Right wrist pain and open wound/ fall. History Obtained From:  patient, family member - daughter, electronic medical record    HISTORY OF PRESENT ILLNESS:    Ms. Snow Fang is a 80 y.o.  female right handed who seen today for consultation and evaluation of a right wrist injury. The patient reports that this injury occurred when she fell in her kitche. She was first seen and evaluated in [place] ED, when she was x-rayed and splinted, and admitted with Orthopedic consultation. The patient denies any other injuries. Rates pain moderate, sharp, constant and show no change. Movement makes the pain worse, the splint and resting makes the pain better. Alleviating factors elevation and rest. No numbness or tingling sensation. Past Medical History:    History reviewed. No pertinent past medical history. Past Surgical History:    History reviewed. No pertinent surgical history. Medications prior to admission:   Prior to Admission medications    Medication Sig Start Date End Date Taking?  Authorizing Provider   brimonidine (ALPHAGAN) 0.2 % ophthalmic solution 1 drop in the morning and at bedtime   Yes Historical Provider, MD   metoprolol succinate (TOPROL XL) 50 MG extended release tablet Take 50 mg by mouth daily   Yes Historical Provider, MD   triamterene-hydroCHLOROthiazide (DYAZIDE) 37.5-25 MG per capsule Take 1 capsule by mouth every morning   Yes Historical Provider, MD   ferrous sulfate (IRON 325) 325 (65 Fe) MG tablet Take 325 mg by mouth daily (with breakfast)   Yes Historical Provider, MD   Calcium Carb-Cholecalciferol (CALCIUM 1000 + D) 1000-800 MG-UNIT TABS Take 1 tablet by mouth daily   Yes Historical Provider, MD vitamin D (CHOLECALCIFEROL) 25 MCG (1000 UT) TABS tablet Take 2,000 Units by mouth every evening   Yes Historical Provider, MD   vitamin C (ASCORBIC ACID) 500 MG tablet Take 500 mg by mouth daily   Yes Historical Provider, MD   vitamin E 400 UNIT capsule Take 400 Units by mouth daily   Yes Historical Provider, MD   B Complex Vitamins (B-COMPLEX/B-12) TABS Take 1 tablet by mouth every morning (before breakfast)   Yes Historical Provider, MD       Current Medications:   Current Facility-Administered Medications: sodium chloride flush 0.9 % injection 5-40 mL, 5-40 mL, IntraVENous, 2 times per day  sodium chloride flush 0.9 % injection 5-40 mL, 5-40 mL, IntraVENous, PRN  0.9 % sodium chloride infusion, 25 mL, IntraVENous, PRN  acetaminophen (TYLENOL) tablet 650 mg, 650 mg, Oral, Q6H PRN **OR** acetaminophen (TYLENOL) suppository 650 mg, 650 mg, Rectal, Q6H PRN  ondansetron (ZOFRAN) injection 4 mg, 4 mg, IntraVENous, Q6H PRN  morphine (PF) injection 2 mg, 2 mg, IntraVENous, Q4H PRN  enoxaparin (LOVENOX) injection 30 mg, 30 mg, SubCUTAneous, Daily  ceFAZolin (ANCEF) 2000 mg in dextrose 5 % 100 mL IVPB, 2,000 mg, IntraVENous, Q8H  magnesium sulfate 4000 mg in 100 mL IVPB premix, 4,000 mg, IntraVENous, Once  0.9 % sodium chloride infusion, , IntraVENous, Continuous    Allergies:  Patient has no known allergies. Social History     Socioeconomic History    Marital status:       Spouse name: Not on file    Number of children: Not on file    Years of education: Not on file    Highest education level: Not on file   Occupational History    Not on file   Tobacco Use    Smoking status: Never Smoker    Smokeless tobacco: Never Used   Vaping Use    Vaping Use: Never used   Substance and Sexual Activity    Alcohol use: Never    Drug use: Never    Sexual activity: Not on file   Other Topics Concern    Not on file   Social History Narrative    Not on file     Social Determinants of Health     Financial Resource Strain:     Difficulty of Paying Living Expenses: Not on file   Food Insecurity:     Worried About Running Out of Food in the Last Year: Not on file    Madhavi of Food in the Last Year: Not on file   Transportation Needs:     Lack of Transportation (Medical): Not on file    Lack of Transportation (Non-Medical): Not on file   Physical Activity:     Days of Exercise per Week: Not on file    Minutes of Exercise per Session: Not on file   Stress:     Feeling of Stress : Not on file   Social Connections:     Frequency of Communication with Friends and Family: Not on file    Frequency of Social Gatherings with Friends and Family: Not on file    Attends Yarsani Services: Not on file    Active Member of [address] [place] [address] [employer] or Organizations: Not on file    Attends Club or Organization Meetings: Not on file    Marital Status: Not on file   Intimate Partner Violence:     Fear of Current or Ex-Partner: Not on file    Emotionally Abused: Not on file    Physically Abused: Not on file    Sexually Abused: Not on file   Housing Stability:     Unable to Pay for Housing in the Last Year: Not on file    Number of [place] in the Last Year: Not on file    Unstable Housing in the Last Year: Not on file       Family History:  History reviewed. No pertinent family history. REVIEW OF SYSTEMS:   CONSTITUTIONAL: Denies unexplained weight loss, fevers, chills or fatigue  NEUROLOGICAL: Denies unsteady gait or progressive weakness    PSYCHOLOGICAL: Denies anxiety, depression   SKIN: Denies skin changes, delayed healing, rash, itching   HEMATOLOGIC: Denies easy bleeding or bruising  ENDOCRINE: Denies excessive thirst, urination, heat/cold  RESPIRATORY: Denies current dyspnea, cough  CARDIOVASCULAR: Negative for chest pain at this time. EYES: Negative for photophobia and visual disturbance. ENT:  Negative for rhinorrhea, epistaxis, sore throat, or hearing loss.   GI: Denies nausea, vomiting, diarrhea   : Denies bowel or bladder issues   MUSCULOSKELETAL: Right wrist pain and open wound. All other ROS reviewed in chart or with patient or family and are grossly negative. PHYSICAL EXAMINATION:  Ms. Thalia Luke is a very pleasant 80 y.o. female who seen today in no acute distress, awake, alert, and oriented. She is well nourished and groomed. Patient with normal affect. Body mass index is 15.33 kg/m². . Skin warm and dry. Resting respiratory rate is 16. Resp deep and easy. Pulse is with regular rate and rhythm    BP (!) 158/80   Pulse 91   Temp 98.8 °F (37.1 °C) (Oral)   Resp 16   Ht 5' 5\" (1.651 m)   Wt 92 lb 2.4 oz (41.8 kg)   SpO2 96%   BMI 15.33 kg/m²        Airway is intact  Chest: chest clear, no wheezing, rales, normal symmetric air entry, no tachypnea, retractions or cyanosis  Heart: regular rate and rhythm ; heart sounds normal   Hearing intact, pupil equal and reactive bilateral  Lymphatics; No groin or axillary enlarged lymph nodes. Neck; No swelling  Abdomen; soft, non distended. MUSCULOSKELETAL:   On evaluation of her right upper extremity, there is moderate deformity. There is moderate swelling and moderate ecchymosis and a volar ulnar open wound. She is tender to palpation over the distal radius and ulna, and otherwise nontender over the remainder of the extremity. Range of motion is decreased secondary to pain over the right wrist, but no mechanical block. The skin overlying the right wrist is intact without evidence of lesion, laceration or abrasion. Distal pulses are 2+ and symmetric bilaterally. Sensation is grossly intact to light touch and symmetric bilaterally.       NEUROLOGIC:   Sensory:    Touch:                     Right Upper Extremity:  normal                   Left Upper Extremity:  normal                  Right Lower Extremity:  normal                  Left Lower Extremity:  normal        DATA:    CBC:   Lab Results   Component Value Date    WBC 4.8 02/27/2022    RBC 3.95 02/27/2022 HGB 11.8 02/27/2022    HCT 35.9 02/27/2022    MCV 90.7 02/27/2022    MCH 29.8 02/27/2022    MCHC 32.8 02/27/2022    RDW 13.1 02/27/2022     02/27/2022    MPV 7.4 02/27/2022     WBC:    Lab Results   Component Value Date    WBC 4.8 02/27/2022     PT/INR:    Lab Results   Component Value Date    PROTIME 12.0 02/26/2022    INR 1.06 02/26/2022     PTT:  No results found for: APTT[APTT    IMAGING: Xrays dated 2/26/2022, 3 views of right wrist were reviewed, and showed markedly displaced distal radius fracture just proximal to old volar distal radius plate. IMPRESSION: Right wrist pain/ markedly displaced distal radius periprosthetic G II open fracture. PLAN:  I discussed with Rylee Quick and her daughter the overall alignment of the fracture and treatment options including both surgical and non-surgical treatment, and that my recommendation is an urgent I&D and open reduction and internal fixation given the amount of displacement and comminution of the open fracture. I discussed the risks and benefits of surgery with the patient, including but not limited to infection, bleeding, pain, injury to nerves or blood vessels failure of the surgery and need for additional surgery. All the patient's questions were answered. We discussed an expected post-operative course. She  is understanding of this and wishes to proceed. Surgery this morning. Thank you very much for the kind consultation and allowing me to participate in this patient's care. I will continue to keep you apprised of her progress.          Fabiola Baker MD   2/27/2022  9:30 AM

## 2022-02-27 NOTE — PROGRESS NOTES
Pharmacy Medication Reconciliation Note     List of medications Rajan Ca is currently taking is complete. Source of information:   1. Conversation with patient at bedside  2. EMR    Notes regarding home medications:   1.  Patient was able to report all home meds and that she believes she took all this AM PTA in the ED    Patient denies taking any OTC or herbal medications other than those listed    Kirk Phelps, Pharmacy Intern  2/26/2022  9:42 PM

## 2022-02-27 NOTE — PROGRESS NOTES
Hospitalist Progress Note      PCP: Frankey Monaco    Date of Admission: 2/26/2022    Subjective: Seen after her surgery, denies pain, denies chest pain shortness of breath nausea or vomiting. Medications:  Reviewed    Infusion Medications    sodium chloride      sodium chloride      sodium chloride 100 mL/hr at 02/27/22 1225     Scheduled Medications    sodium chloride flush  5-40 mL IntraVENous 2 times per day    enoxaparin  30 mg SubCUTAneous Daily    ceFAZolin (ANCEF) IVPB  2,000 mg IntraVENous Q8H     PRN Meds: sodium chloride, acetaminophen **OR** acetaminophen, ondansetron, morphine, sodium chloride flush      Intake/Output Summary (Last 24 hours) at 2/27/2022 1323  Last data filed at 2/27/2022 1225  Gross per 24 hour   Intake 750 ml   Output 600 ml   Net 150 ml       Physical Exam Performed:    BP (!) 154/75   Pulse 101   Temp 98.3 °F (36.8 °C) (Temporal)   Resp 16   Ht 5' 5\" (1.651 m)   Wt 92 lb 2.4 oz (41.8 kg)   SpO2 97%   BMI 15.33 kg/m²     General appearance: No apparent distress  Respiratory:  Normal respiratory effort. Clear to auscultation, bilaterally without Rales/Wheezes/Rhonchi. Cardiovascular: Regular rate and rhythm with normal S1/S2 without murmurs, rubs or gallops. Abdomen: Soft, non-tender, non-distended   Musculoskeletal: No clubbing, cyanosis. Immobilized right wrist.  Skin: Skin color, texture, turgor normal.  No rashes or lesions. Neurologic:  No focal weakness  Psychiatric: Alert and oriented  Capillary Refill: Brisk,3 seconds, normal   Peripheral Pulses: +2 palpable, equal bilaterally       Labs:   Recent Labs     02/26/22 1911 02/27/22  0545   WBC 6.1 4.8   HGB 12.9 11.8*   HCT 39.4 35.9*    192     Recent Labs     02/26/22 1911 02/27/22  0545    137   K 3.9 4.0   CL 98* 101   CO2 29 26   BUN 23* 18   CREATININE 0.9 0.7   CALCIUM 9.7 8.8     No results for input(s): AST, ALT, BILIDIR, BILITOT, ALKPHOS in the last 72 hours.   Recent Labs 02/26/22  1911   INR 1.06     No results for input(s): Sinan Benson in the last 72 hours. Urinalysis:      Lab Results   Component Value Date    NITRU Negative 02/26/2022    BLOODU Negative 02/26/2022    SPECGRAV 1.021 02/26/2022    GLUCOSEU Negative 02/26/2022       Radiology:  XR WRIST RIGHT (MIN 3 VIEWS)   Final Result      FLUORO FOR SURGICAL PROCEDURES   Final Result      CT CHEST WO CONTRAST   Final Result   No acute abnormality identified. The opacity seen in the right perihilar region is most likely secondary to   superimposition of dense breast parenchyma with the right hilum. Small noncalcified nodules are identified within the very anterior aspect of   the right lower lobe. No routine follow-up is recommended based upon the   most recent recommendations. See full recommendations below. RECOMMENDATIONS:   Multiple pulmonary nodules. Most severe: 5 mm right solid pulmonary nodule. No routine follow-up imaging is recommended per Fleischner Society Guidelines. These guidelines do not apply to immunocompromised patients and patients with   cancer. Follow up in patients with significant comorbidities as clinically   warranted. For lung cancer screening, adhere to Lung-RADS guidelines. Reference: Radiology. 2017; 284(1):228-43. XR WRIST RIGHT (MIN 3 VIEWS)   Final Result   Interval partial reduction of the distal right radial and ulnar fractures. XR CHEST 1 VIEW   Final Result   Right perihilar opacity. Correlate with presentation. CT WRIST RIGHT WO CONTRAST   Final Result   1. Acute and displaced fractures of the distal radius and distal ulna with   impaction at the fracture site and pronounced malalignment of the distal   ulnar fracture and distal radioulnar joint. 2. Soft tissue edema and subcutaneous gas compatible with open fracture. 3. Severe osteopenia. 4. 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   5. Chondrocalcinosis. CT HEAD WO CONTRAST   Final Result   No acute intracranial abnormality. Specifically, no acute intracranial   hemorrhage or mass effect. Chronic small vessel ischemic disease. CT PELVIS WO CONTRAST Additional Contrast? None   Final Result   No acute fracture is identified with the pelvis and within the hips. Amorphous calcification adjacent the right greater trochanter, which raise   the possibility of calcium hydroxyapatite deposition, which can be a   generator of pain. CT CERVICAL SPINE WO CONTRAST   Final Result   No acute fracture or traumatic malalignment. Basilar invagination of the dens, with some erosions seen at the atlantoaxial   joint, which raises the possibility of an erosive arthropathy. XR RADIUS ULNA RIGHT (2 VIEWS)   Preliminary Result   Acute displaced distal radius and ulnar fractures. Assessment/Plan:    Active Hospital Problems    Diagnosis     Open wrist fracture, right, initial encounter [S62.101B]     Wrist fracture [S62.109A]     HTN (hypertension) [I10]     Wrist fracture, closed, right, initial encounter [S62.101A]      1. Wrist fracture, Ortho consulted, post op day 0 per Ortho pain controlled  2. Essential hypertension, resume p.o. medications  3. Hypomagnesemia, replaced      Diet: ADULT DIET;  Regular  Code Status: Full Code        Merlin Burner, MD

## 2022-02-27 NOTE — PROGRESS NOTES
[facility] Orthopedic Surgery  Consult Note          Orthopedic Consult, full note to follow in am.    Pavithra Lane 80 y.o. admitted for a fall with G II open right wrist fracture. Xray reviewed, and showed a periprosthetic right distal radius fracture with distal ulna fracture. Plan:  - Recommend I&D and ORIF right distal radius.  - Surgery tomorrow around 10:30 AM  - D/W her daughter Essence Galarza over the phone. Thank you very much for the kind consultation and allowing me to participate in this patient's care. I will continue to keep you apprised of her progress.          Yesika Anton MD, 2/26/2022 9:08 PM

## 2022-02-27 NOTE — H&P
Hospital Medicine History & Physical      PCP: Jadyn Hatch    Date of Admission: 2/26/2022    Date of Service: Pt seen/examined on 2/26/2022 and admitted to inpatient    Chief Complaint: Fall and wrist injury      History Of Present Illness: The patient is a 80 y.o. female with past medical history of hypertension and possibly also osteoporosis and osteoarthritis who presents to [facility] with noted injury after patient was attempting to place some silver back in the fridge and unfortunately while she was apparently elevated from the floor she unfortunately lost her balance and fell onto her right side and apparently did hit her head but did not have any noted loss of consciousness or any other symptoms of injury. She came to the ED for further evaluation and multiple imaging studies demonstrated a new occurrence of injury and fracture to her right wrist which she has had previous surgery on in the past.  She denies any recent other symptoms of fever, chills, dizziness, syncope, chest pain, shortness of breath, dysuria, blood in urine/stool/sputum, nausea/vomiting/diarrhea/abdominal pain, poor appetite. Past Medical History:    History reviewed. No pertinent past medical history. Past Surgical History:    History reviewed. No pertinent surgical history. Medications Prior to Admission:    Prior to Admission medications    Medication Sig Start Date End Date Taking?  Authorizing Provider   brimonidine (ALPHAGAN) 0.2 % ophthalmic solution 1 drop in the morning and at bedtime   Yes Historical Provider, MD   metoprolol succinate (TOPROL XL) 50 MG extended release tablet Take 50 mg by mouth daily   Yes Historical Provider, MD   triamterene-hydroCHLOROthiazide (DYAZIDE) 37.5-25 MG per capsule Take 1 capsule by mouth every morning   Yes Historical Provider, MD ferrous sulfate (IRON 325) 325 (65 Fe) MG tablet Take 325 mg by mouth daily (with breakfast)   Yes Historical Provider, MD   Calcium Carb-Cholecalciferol (CALCIUM 1000 + D) 1000-800 MG-UNIT TABS Take 1 tablet by mouth daily   Yes Historical Provider, MD   vitamin D (CHOLECALCIFEROL) 25 MCG (1000 UT) TABS tablet Take 2,000 Units by mouth every evening   Yes Historical Provider, MD   vitamin C (ASCORBIC ACID) 500 MG tablet Take 500 mg by mouth daily   Yes Historical Provider, MD   vitamin E 400 UNIT capsule Take 400 Units by mouth daily   Yes Historical Provider, MD   B Complex Vitamins (B-COMPLEX/B-12) TABS Take 1 tablet by mouth every morning (before breakfast)   Yes Historical Provider, MD       Allergies:  Patient has no known allergies. Social History:  The patient currently lives home    TOBACCO:   reports that she has never smoked. She has never used smokeless tobacco.  ETOH:   reports no history of alcohol use. Family History:  Reviewed in detail and negative for DM, Early CAD, Cancer, CVA. Positive as follows:    History reviewed. No pertinent family history. REVIEW OF SYSTEMS:    as noted in the HPI. All other systems reviewed and negative. PHYSICAL EXAM:    BP (!) 158/80   Pulse 91   Temp 98.8 °F (37.1 °C) (Oral)   Resp 16   Ht 5' 5\" (1.651 m)   Wt 92 lb 2.4 oz (41.8 kg)   SpO2 96%   BMI 15.33 kg/m²     General appearance: Currently no acute respiratory distress, alert and oriented x4, little hard of hearing, pleasant and conversational   HEENT Normal cephalic, atraumatic without obvious deformity. Pupils equal, round, and reactive to light. Extra ocular muscles intact. Conjunctivae/corneas clear. mildly dry mucous membranes  Neck: Supple, no JVD  Lungs: Clear to auscultation, bilaterally without Rales/Wheezes/Rhonchi with good respiratory effort.   Heart: Regular rate and rhythm with Normal S1/S2 without murmurs, rubs or gallops, point of maximum impulse non-displaced  Abdomen: Soft, non-tender or non-distended without rigidity or guarding and positive bowel sounds all four quadrants. Extremities: No lower extremity edema. Noted right upper extremity to the right wrist is currently wrapped and splinted. There is some blood to the left hand noted. To left hand noted patient does have multiple findings of osteoarthritis including her herbeden and Palma's nodes  Skin: No rashes  Neurologic: Grossly intact neurologically in 5-5 strength all extremities  Mental status: Alert, oriented, thought content appropriate. Capillary Refill: Acceptable  < 3 seconds  Peripheral Pulses: +3 Easily felt, not easily obliterated with pressure    02/26/22 1842  CT HEAD WO CONTRAST   Performed: 02/26/22 1789  Final        Impression: No acute intracranial abnormality. Specifically, no acute intracranial hemorrhage or mass effect. Chronic small vessel ischemic disease. 02/26/22 1828  XR RADIUS ULNA RIGHT (2 VIEWS)   Performed: 02/26/22 1753  Preliminary        Impression: Acute displaced distal radius and ulnar fractures. CT chest without contrast:  No acute abnormality identified.       The opacity seen in the right perihilar region is most likely secondary to   superimposition of dense breast parenchyma with the right hilum.       Small noncalcified nodules are identified within the very anterior aspect of   the right lower lobe.  No routine follow-up is recommended based upon the   most recent recommendations.  See full recommendations below. CT of the right wrist without contrast:  1. Acute and displaced fractures of the distal radius and distal ulna with   impaction at the fracture site and pronounced malalignment of the distal   ulnar fracture and distal radioulnar joint. 2. Soft tissue edema and subcutaneous gas compatible with open fracture. 3. Severe osteopenia. 4. 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   5. Chondrocalcinosis.          CBC   Recent Labs 02/26/22 1911 02/27/22  0545   WBC 6.1 4.8   HGB 12.9 11.8*   HCT 39.4 35.9*    192      RENAL  Recent Labs     02/26/22 1911 02/27/22  0545    137   K 3.9 4.0   CL 98* 101   CO2 29 26   BUN 23* 18   CREATININE 0.9 0.7     LFT'S  No results for input(s): AST, ALT, ALB, BILIDIR, BILITOT, ALKPHOS in the last 72 hours. COAG  Recent Labs     02/26/22 1911   INR 1.06     CARDIAC ENZYMES  No results for input(s): CKTOTAL, CKMB, CKMBINDEX, TROPONINI in the last 72 hours. U/A:    Lab Results   Component Value Date    COLORU YELLOW 02/26/2022    CLARITYU Clear 02/26/2022    SPECGRAV 1.021 02/26/2022    LEUKOCYTESUR Negative 02/26/2022    BLOODU Negative 02/26/2022    GLUCOSEU Negative 02/26/2022       ABG  No results found for: LRS6HYN, BEART, D6AJONWH, PHART, THGBART, OBS2VZW, PO2ART, BLQ7RCX        Active Hospital Problems    Diagnosis Date Noted    Wrist fracture [S62.109A] 02/26/2022    HTN (hypertension) [I10] 02/26/2022    Wrist fracture, closed, right, initial encounter Colette Berg 02/26/2022         PHYSICIANS CERTIFICATION:    I certify that Maximino Watkins is expected to be hospitalized for greater than 2 midnights based on the following assessment and plan:      ASSESSMENT/PLAN:  · Wrist fracture  · Hypertension    Plan:  · Orthopedic surgery consulted, plan for OR in the morning  · Keeping patient n.p.o.  · Start patient on IV fluids with normal saline at 100/h x 10 hours  · Cefazolin for surgical prophylaxis  · Morphine 2 mg as needed for pain  · Repeat labs daily    DVT Prophylaxis: Lovenox  Diet: Diet NPO Exceptions are: Ice Chips, Sips of Water with Meds  Code Status: Full Code  PT/OT Eval Status: Ambulatory    Dispo -pending clinical course       Parminder Tyson DO    Thank you Camilo Estrada for the opportunity to be involved in this patient's care. If you have any questions or concerns please feel free to contact me at [phone].

## 2022-02-27 NOTE — PROGRESS NOTES
Daughter is requesting a stay in an inpatient rehab d/t patient having bad arthritis in both knees and severe loss of mobility of right hand.

## 2022-02-27 NOTE — PROGRESS NOTES
Patient admitted to room 3126 via stretcher with ER staff. Pt oriented to room and routine. Call light in reach, fall precautions in place.

## 2022-02-27 NOTE — PLAN OF CARE
Problem: Pain:  Goal: Pain level will decrease  Description: Pain level will decrease  2/27/2022 0743 by Sangita Damian RN  Outcome: Ongoing  Note: Pain /discomfort being managed with PRN analgesics per MD orders. Patient able to express presence and absence of pain and rate pain appropriately using numerical scale. 2/27/2022 0211 by Walt Norman RN  Outcome: Ongoing  Note: Pt educated to attempt non-phagological method of pain control, but it it becomes too strong use PRN analgesics. Pain and discomfort being managed PRN analgesics per MD orders. Pt able to express presence of pain. Goal: Control of acute pain  Description: Control of acute pain  2/27/2022 0743 by Sangita Damian RN  Outcome: Ongoing  2/27/2022 0211 by Walt Norman RN  Outcome: Ongoing  Note: Patient educated on acute pain. Taught patient to use call light to ask for pain medication. PRN pain medication given for acute pain. Will continue to monitor pain per unit protocol. Goal: Control of chronic pain  Description: Control of chronic pain  2/27/2022 0743 by Sangita Damian RN  Outcome: Ongoing  2/27/2022 0211 by Walt Norman RN  Outcome: Ongoing  Note: Patient educated on chronic pain. Taught patient to use call light to ask for pain medication. PRN pain medication given for chronic pain. Will continue to monitor pain per unit protocol. Problem: Falls - Risk of:  Goal: Will remain free from falls  Description: Will remain free from falls  2/27/2022 0743 by Sangita Damian RN  Outcome: Ongoing  Note: Fall risk assessment completed . Fall precautions in place, bed alarm on, side rails 2/4 up, call light in reach, educated pt on calling for assistance when needed, room clear of clutter. Pt verbalized understanding. 2/27/2022 0211 by Walt Norman RN  Outcome: Ongoing  Note: Fall risk assessment completed . Fall precautions in place, bed/ chair alarm on, side rails 2/4 up, call light in reach, educated pt on calling for assistance when needed, room clear of clutter. Pt verbalized understanding. Goal: Absence of physical injury  Description: Absence of physical injury  2/27/2022 0743 by Elsa Avelar RN  Outcome: Ongoing  2/27/2022 0211 by Tiff Robledo RN  Outcome: Ongoing  Note: Patient remains free from physical injury. Patient educated on safety precautions. Will continue to monitor to ensure patient remains free from physical injury throughout remainder of shift. Problem: Skin Integrity:  Goal: Will show no infection signs and symptoms  Description: Will show no infection signs and symptoms  2/27/2022 0743 by Elsa Avelar RN  Outcome: Ongoing  Note: Patient is alert and oriented, afebrile, has manageable complaints of pain, skin is intact and appropriate for ethnicity in color    2/27/2022 0211 by Tiff Robledo RN  Outcome: Ongoing  Note: Patient shows no signs or symptoms of urinary tract infection at this time. Will continue to monitor throughout shift. Goal: Absence of new skin breakdown  Description: Absence of new skin breakdown  2/27/2022 0743 by Elsa Avelar RN  Outcome: Ongoing  Note: Kingsley score assessed. Patient able to ambulate and turn self and repositioned patient Q2H and assessed skin. Educated patient on importance of repositioning to prevent skin issues. 2/27/2022 0211 by Tiff Robledo RN  Outcome: Ongoing  Note: Skin assessment complete. No new signs of skin breakdown noted. Repositioning patient with pillows at two hour intervals. Heels elevated off bed.

## 2022-02-27 NOTE — PROGRESS NOTES
Patient would like for her daughter Jolene Cleverly to sign her surgical consent. Blank consent form place on hard chart. Patient stated her daughter will be coming in the morning for her surgery.

## 2022-02-27 NOTE — ED PROVIDER NOTES
EKG: Sinus rhythm, rate of 97, PACs, biatrial enlargement. Age-indeterminate anterior infarct. Rhythm strip shows sinus rhythm with a rate of 97, PACs, TX interval 176 ms, QRS 70 ms with no other ectopy as interpreted by me. No old EKG available for comparison.      Daniella Johns MD  02/26/22 0048

## 2022-02-27 NOTE — ANESTHESIA PRE PROCEDURE
Department of Anesthesiology  Preprocedure Note       Name:  Raul Delacruz   Age:  80 y.o.  :  10/17/1930                                          MRN:  [id number]         Date:  2022      Surgeon: Mabel Alonso):  Renetta Barreto MD    Procedure: Procedure(s):  RADIUS OPEN REDUCTION INTERNAL FIXATION    Medications prior to admission:   Prior to Admission medications    Medication Sig Start Date End Date Taking?  Authorizing Provider   brimonidine (ALPHAGAN) 0.2 % ophthalmic solution 1 drop in the morning and at bedtime   Yes Historical Provider, MD   metoprolol succinate (TOPROL XL) 50 MG extended release tablet Take 50 mg by mouth daily   Yes Historical Provider, MD   triamterene-hydroCHLOROthiazide (DYAZIDE) 37.5-25 MG per capsule Take 1 capsule by mouth every morning   Yes Historical Provider, MD   ferrous sulfate (IRON 325) 325 (65 Fe) MG tablet Take 325 mg by mouth daily (with breakfast)   Yes Historical Provider, MD   Calcium Carb-Cholecalciferol (CALCIUM 1000 + D) 1000-800 MG-UNIT TABS Take 1 tablet by mouth daily   Yes Historical Provider, MD   vitamin D (CHOLECALCIFEROL) 25 MCG (1000 UT) TABS tablet Take 2,000 Units by mouth every evening   Yes Historical Provider, MD   vitamin C (ASCORBIC ACID) 500 MG tablet Take 500 mg by mouth daily   Yes Historical Provider, MD   vitamin E 400 UNIT capsule Take 400 Units by mouth daily   Yes Historical Provider, MD   B Complex Vitamins (B-COMPLEX/B-12) TABS Take 1 tablet by mouth every morning (before breakfast)   Yes Historical Provider, MD       Current medications:    Current Facility-Administered Medications   Medication Dose Route Frequency Provider Last Rate Last Admin    sodium chloride flush 0.9 % injection 5-40 mL  5-40 mL IntraVENous 2 times per day Parminder ALEXY Tyson DO        sodium chloride flush 0.9 % injection 5-40 mL  5-40 mL IntraVENous PRN Parminder Tyson DO        0.9 % sodium chloride infusion  25 mL IntraVENous PRN Parminder Ryne Weinberg DO  acetaminophen (TYLENOL) tablet 650 mg  650 mg Oral Q6H PRN [facility] [place] [place]        Or    acetaminophen (TYLENOL) suppository 650 mg  650 mg Rectal Q6H PRN [facility]min[facility] [place] [place]        ondansetron (ZOFRAN) injection 4 mg  4 mg IntraVENous Q6H PRN [facility]minder [facility] [place], DO        morphine (PF) injection 2 mg  2 mg IntraVENous Q4H PRN [facility]        enoxaparin (LOVENOX) injection 30 mg  30 mg SubCUTAneous Daily [facility]minder [facility] [place], DO        ceFAZolin (ANCEF) 2000 mg in dextrose 5 % 100 mL IVPB  2,000 mg IntraVENous Q8H Lokesh Magallanes  mL/hr at 02/27/22 0604 2,000 mg at 02/27/22 0604    magnesium sulfate 4000 mg in 100 mL IVPB premix  4,000 mg IntraVENous Once [facility], DO 25 mL/hr at 02/27/22 0722 4,000 mg at 02/27/22 4529    0.9 % sodium chloride infusion   IntraVENous Continuous [facility],  mL/hr at 02/27/22 0115 New Bag at 02/27/22 0115       Allergies:  No Known Allergies    Problem List:    Patient Active Problem List   Diagnosis Code    Wrist fracture S62.109A    HTN (hypertension) I10    Wrist fracture, closed, right, initial encounter S62.101A       Past Medical History:  History reviewed. No pertinent past medical history. Past Surgical History:  History reviewed. No pertinent surgical history.     Social History:    Social History     Tobacco Use    Smoking status: Never Smoker    Smokeless tobacco: Never Used   Substance Use Topics    Alcohol use: Never                                Counseling given: Not Answered      Vital Signs (Current):   Vitals:    02/26/22 2349 02/27/22 0130 02/27/22 0455 02/27/22 0834   BP: (!) 174/81  (!) 161/81 (!) 158/80   Pulse: 87  91 91   Resp: 16  16 16   Temp: 98.1 °F (36.7 °C)  98.2 °F (36.8 °C) 98.8 °F (37.1 °C)   TempSrc: Oral  Oral Oral   SpO2: 97%  95% 96%   Weight:  92 lb 2.4 oz (41.8 kg)     Height:  5' 5\" (1.651 m)                                                BP Readings from Last 3 Encounters:   02/27/22 (!) 158/80       NPO Status:                                                                                 BMI:   Wt Readings from Last 3 Encounters:   02/27/22 92 lb 2.4 oz (41.8 kg)     Body mass index is 15.33 kg/m². CBC:   Lab Results   Component Value Date    WBC 4.8 02/27/2022    RBC 3.95 02/27/2022    HGB 11.8 02/27/2022    HCT 35.9 02/27/2022    MCV 90.7 02/27/2022    RDW 13.1 02/27/2022     02/27/2022       CMP:   Lab Results   Component Value Date     02/27/2022    K 4.0 02/27/2022    K 3.9 02/26/2022     02/27/2022    CO2 26 02/27/2022    BUN 18 02/27/2022    CREATININE 0.7 02/27/2022    GFRAA >60 02/27/2022    LABGLOM >60 02/27/2022    GLUCOSE 110 02/27/2022    CALCIUM 8.8 02/27/2022       POC Tests: No results for input(s): POCGLU, POCNA, POCK, POCCL, POCBUN, POCHEMO, POCHCT in the last 72 hours. Coags:   Lab Results   Component Value Date    PROTIME 12.0 02/26/2022    INR 1.06 02/26/2022       HCG (If Applicable): No results found for: PREGTESTUR, PREGSERUM, HCG, HCGQUANT     ABGs: No results found for: PHART, PO2ART, DZW6NHD, TZK1XMD, BEART, D5XLYFYZ     Type & Screen (If Applicable):  No results found for: LABABO, LABRH    Drug/Infectious Status (If Applicable):  No results found for: HIV, HEPCAB    COVID-19 Screening (If Applicable):   Lab Results   Component Value Date    COVID19 Not Detected 02/26/2022           Anesthesia Evaluation  Patient summary reviewed  Airway: Mallampati: II  TM distance: >3 FB   Neck ROM: full  Mouth opening: > = 3 FB Dental: normal exam         Pulmonary:normal exam  breath sounds clear to auscultation                             Cardiovascular:    (+) hypertension:,         Rhythm: regular  Rate: normal                    Neuro/Psych:               GI/Hepatic/Renal:             Endo/Other:                     Abdominal:             Vascular:           Other Findings:             Anesthesia Plan      general     ASA 2       Induction: intravenous. MIPS: Postoperative opioids intended and Prophylactic antiemetics administered. Anesthetic plan and risks discussed with patient.       Plan discussed with surgical team.    Attending anesthesiologist reviewed and agrees with Alejo Abarca MD   2/27/2022

## 2022-02-28 LAB
ANION GAP SERPL CALCULATED.3IONS-SCNC: 9 MMOL/L (ref 3–16)
BASOPHILS ABSOLUTE: 0 K/UL (ref 0–0.2)
BASOPHILS RELATIVE PERCENT: 0.2 %
BUN BLDV-MCNC: 15 MG/DL (ref 7–20)
CALCIUM SERPL-MCNC: 8.6 MG/DL (ref 8.3–10.6)
CHLORIDE BLD-SCNC: 98 MMOL/L (ref 99–110)
CO2: 26 MMOL/L (ref 21–32)
CREAT SERPL-MCNC: 0.7 MG/DL (ref 0.6–1.2)
EOSINOPHILS ABSOLUTE: 0 K/UL (ref 0–0.6)
EOSINOPHILS RELATIVE PERCENT: 0.2 %
GFR AFRICAN AMERICAN: >60
GFR NON-AFRICAN AMERICAN: >60
GLUCOSE BLD-MCNC: 102 MG/DL (ref 70–99)
HCT VFR BLD CALC: 33.8 % (ref 36–48)
HEMOGLOBIN: 11.1 G/DL (ref 12–16)
LYMPHOCYTES ABSOLUTE: 1.4 K/UL (ref 1–5.1)
LYMPHOCYTES RELATIVE PERCENT: 22.2 %
MAGNESIUM: 2.1 MG/DL (ref 1.8–2.4)
MCH RBC QN AUTO: 29.8 PG (ref 26–34)
MCHC RBC AUTO-ENTMCNC: 32.7 G/DL (ref 31–36)
MCV RBC AUTO: 91.1 FL (ref 80–100)
MONOCYTES ABSOLUTE: 0.6 K/UL (ref 0–1.3)
MONOCYTES RELATIVE PERCENT: 10.3 %
NEUTROPHILS ABSOLUTE: 4.2 K/UL (ref 1.7–7.7)
NEUTROPHILS RELATIVE PERCENT: 67.1 %
PDW BLD-RTO: 13.3 % (ref 12.4–15.4)
PLATELET # BLD: 180 K/UL (ref 135–450)
PMV BLD AUTO: 7 FL (ref 5–10.5)
POTASSIUM SERPL-SCNC: 4 MMOL/L (ref 3.5–5.1)
RBC # BLD: 3.71 M/UL (ref 4–5.2)
SODIUM BLD-SCNC: 133 MMOL/L (ref 136–145)
WBC # BLD: 6.2 K/UL (ref 4–11)

## 2022-02-28 PROCEDURE — 2580000003 HC RX 258: Performed by: ORTHOPAEDIC SURGERY

## 2022-02-28 PROCEDURE — 97162 PT EVAL MOD COMPLEX 30 MIN: CPT

## 2022-02-28 PROCEDURE — 97530 THERAPEUTIC ACTIVITIES: CPT

## 2022-02-28 PROCEDURE — 1200000000 HC SEMI PRIVATE

## 2022-02-28 PROCEDURE — 97535 SELF CARE MNGMENT TRAINING: CPT

## 2022-02-28 PROCEDURE — 36415 COLL VENOUS BLD VENIPUNCTURE: CPT

## 2022-02-28 PROCEDURE — 97166 OT EVAL MOD COMPLEX 45 MIN: CPT

## 2022-02-28 PROCEDURE — 80048 BASIC METABOLIC PNL TOTAL CA: CPT

## 2022-02-28 PROCEDURE — 6370000000 HC RX 637 (ALT 250 FOR IP): Performed by: ORTHOPAEDIC SURGERY

## 2022-02-28 PROCEDURE — 85025 COMPLETE CBC W/AUTO DIFF WBC: CPT

## 2022-02-28 PROCEDURE — 83735 ASSAY OF MAGNESIUM: CPT

## 2022-02-28 PROCEDURE — 6370000000 HC RX 637 (ALT 250 FOR IP): Performed by: INTERNAL MEDICINE

## 2022-02-28 PROCEDURE — 6360000002 HC RX W HCPCS: Performed by: ORTHOPAEDIC SURGERY

## 2022-02-28 RX ORDER — DOCUSATE SODIUM 100 MG/1
100 CAPSULE, LIQUID FILLED ORAL 2 TIMES DAILY PRN
Status: DISCONTINUED | OUTPATIENT
Start: 2022-02-28 | End: 2022-03-01 | Stop reason: HOSPADM

## 2022-02-28 RX ADMIN — CEFAZOLIN 2000 MG: 10 INJECTION, POWDER, FOR SOLUTION INTRAVENOUS at 15:35

## 2022-02-28 RX ADMIN — DOCUSATE SODIUM 100 MG: 100 CAPSULE, LIQUID FILLED ORAL at 16:58

## 2022-02-28 RX ADMIN — SODIUM CHLORIDE: 4.5 INJECTION, SOLUTION INTRAVENOUS at 02:47

## 2022-02-28 RX ADMIN — SODIUM CHLORIDE: 4.5 INJECTION, SOLUTION INTRAVENOUS at 17:33

## 2022-02-28 RX ADMIN — ENOXAPARIN SODIUM 30 MG: 100 INJECTION SUBCUTANEOUS at 08:30

## 2022-02-28 RX ADMIN — DICLOFENAC SODIUM 2 G: 10 GEL TOPICAL at 17:28

## 2022-02-28 RX ADMIN — ACETAMINOPHEN 650 MG: 325 TABLET ORAL at 15:43

## 2022-02-28 RX ADMIN — CEFAZOLIN 2000 MG: 10 INJECTION, POWDER, FOR SOLUTION INTRAVENOUS at 08:28

## 2022-02-28 ASSESSMENT — PAIN DESCRIPTION - ORIENTATION
ORIENTATION: RIGHT
ORIENTATION: RIGHT;LEFT
ORIENTATION: RIGHT;LEFT

## 2022-02-28 ASSESSMENT — PAIN DESCRIPTION - FREQUENCY
FREQUENCY: CONTINUOUS

## 2022-02-28 ASSESSMENT — PAIN DESCRIPTION - PROGRESSION
CLINICAL_PROGRESSION: GRADUALLY IMPROVING
CLINICAL_PROGRESSION: NOT CHANGED
CLINICAL_PROGRESSION: GRADUALLY WORSENING

## 2022-02-28 ASSESSMENT — PAIN DESCRIPTION - ONSET
ONSET: ON-GOING

## 2022-02-28 ASSESSMENT — PAIN DESCRIPTION - PAIN TYPE
TYPE: CHRONIC PAIN
TYPE: CHRONIC PAIN
TYPE: SURGICAL PAIN

## 2022-02-28 ASSESSMENT — PAIN - FUNCTIONAL ASSESSMENT
PAIN_FUNCTIONAL_ASSESSMENT: PREVENTS OR INTERFERES SOME ACTIVE ACTIVITIES AND ADLS

## 2022-02-28 ASSESSMENT — PAIN DESCRIPTION - DESCRIPTORS
DESCRIPTORS: ACHING;CONSTANT
DESCRIPTORS: ACHING
DESCRIPTORS: ACHING;CONSTANT

## 2022-02-28 ASSESSMENT — PAIN DESCRIPTION - LOCATION
LOCATION: KNEE
LOCATION: ARM;WRIST
LOCATION: KNEE

## 2022-02-28 ASSESSMENT — PAIN SCALES - GENERAL
PAINLEVEL_OUTOF10: 2
PAINLEVEL_OUTOF10: 2
PAINLEVEL_OUTOF10: 3

## 2022-02-28 NOTE — ACP (ADVANCE CARE PLANNING)
Advance Care Planning     Advance Care Planning Activator (Inpatient)  Conversation Note      Date of ACP Conversation: 2/28/2022     Conversation Conducted with: Patient with Decision Making Capacity and daughter, Alondra Walsh, present in the room    ACP Activator: [address] Maker:     Current Designated Health Care Decision Maker:     Primary Decision Maker: Sammie Dumont Child - [phone]    Care Preferences    Ventilation: \"If you were in your present state of health and suddenly became very ill and were unable to breathe on your own, what would your preference be about the use of a ventilator (breathing machine) if it were available to you? \"      Would the patient desire the use of ventilator (breathing machine)?: yes    \"If your health worsens and it becomes clear that your chance of recovery is unlikely, what would your preference be about the use of a ventilator (breathing machine) if it were available to you? \"     Would the patient desire the use of ventilator (breathing machine)?: No      Resuscitation  \"CPR works best to restart the heart when there is a sudden event, like a heart attack, in someone who is otherwise healthy. Unfortunately, CPR does not typically restart the heart for people who have serious health conditions or who are very sick. \"    \"In the event your heart stopped as a result of an underlying serious health condition, would you want attempts to be made to restart your heart (answer \"yes\" for attempt to resuscitate) or would you prefer a natural death (answer \"no\" for do not attempt to resuscitate)? \" yes       [] Yes   [] No   Educated Patient / Walt Palacios regarding differences between Advance Directives and portable DNR orders.     Length of ACP Conversation in minutes:      Conversation Outcomes:  [x] ACP discussion completed  [x] Existing advance directive reviewed with patient; no changes to patient's previously recorded wishes  [] New Advance Directive completed  [] Portable Do Not Rescitate prepared for Provider review and signature  [] POLST/POST/MOLST/MOST prepared for Provider review and signature      Follow-up plan:    [] Schedule follow-up conversation to continue planning  [] Referred individual to Provider for additional questions/concerns   [] Advised patient/agent/surrogate to review completed ACP document and update if needed with changes in condition, patient preferences or care setting    [x] This note routed to one or more involved healthcare providers    Electronically signed by MAGED Kellogg, FAMILIA, Case Management on 2/28/2022 at 4:20 PM  [facility] [phone]

## 2022-02-28 NOTE — PROGRESS NOTES
Patient resting in bed. Rt arm up on pillow with ice packs. Patient rates pain at 1-2 on scale 1-10. Breakthrough bloody drainage note on rt arm dressing. Dressing reinforced. Fall precautions in place, bed arm activated, call light in reach. Will continue to monitor.

## 2022-02-28 NOTE — PROGRESS NOTES
Hospitalist Progress Note      PCP: Anatoliy Fagan    Date of Admission: 2/26/2022    Subjective: Denies complaints. Still some mild pain in the right wrist but otherwise feels good      Medications:  Reviewed    Infusion Medications    sodium chloride      sodium chloride 75 mL/hr at 02/28/22 0247     Scheduled Medications    sodium chloride flush  5-40 mL IntraVENous 2 times per day    enoxaparin  30 mg SubCUTAneous Daily    ceFAZolin (ANCEF) IVPB  2,000 mg IntraVENous Q8H     PRN Meds: sodium chloride, acetaminophen **OR** acetaminophen, ondansetron, morphine, sodium chloride flush      Intake/Output Summary (Last 24 hours) at 2/28/2022 0927  Last data filed at 2/28/2022 3815  Gross per 24 hour   Intake 2727.22 ml   Output 2150 ml   Net 577.22 ml       Physical Exam Performed:    BP (!) 148/70   Pulse 101   Temp 100.1 °F (37.8 °C) (Oral)   Resp 15   Ht 5' 5\" (1.651 m)   Wt 92 lb 6 oz (41.9 kg)   SpO2 96%   BMI 15.37 kg/m²     General appearance: No apparent distress  Respiratory:  Normal respiratory effort. Clear to auscultation, bilaterally without Rales/Wheezes/Rhonchi. Cardiovascular: Regular rate and rhythm with normal S1/S2 without murmurs, rubs or gallops. Abdomen: Soft, non-tender, non-distended   Musculoskeletal: No clubbing, cyanosis. Immobilized right wrist.  Skin: Skin color, texture, turgor normal.  No rashes or lesions.   Neurologic:  No focal weakness  Psychiatric: Alert and oriented  Capillary Refill: Brisk,3 seconds, normal   Peripheral Pulses: +2 palpable, equal bilaterally       Labs:   Recent Labs     02/26/22 1911 02/27/22  0545 02/28/22  0529   WBC 6.1 4.8 6.2   HGB 12.9 11.8* 11.1*   HCT 39.4 35.9* 33.8*    192 180     Recent Labs     02/26/22 1911 02/27/22  0545 02/28/22  0529    137 133*   K 3.9 4.0 4.0   CL 98* 101 98*   CO2 29 26 26   BUN 23* 18 15   CREATININE 0.9 0.7 0.7   CALCIUM 9.7 8.8 8.6     No results for input(s): AST, ALT, BILIDIR, BILITOT, ALKPHOS in the last 72 hours. Recent Labs     02/26/22 1911   INR 1.06     No results for input(s): Kia Divine in the last 72 hours. Urinalysis:      Lab Results   Component Value Date    NITRU Negative 02/26/2022    BLOODU Negative 02/26/2022    SPECGRAV 1.021 02/26/2022    GLUCOSEU Negative 02/26/2022       Radiology:  XR WRIST RIGHT (MIN 3 VIEWS)   Final Result      FLUORO FOR SURGICAL PROCEDURES   Final Result      CT CHEST WO CONTRAST   Final Result   No acute abnormality identified. The opacity seen in the right perihilar region is most likely secondary to   superimposition of dense breast parenchyma with the right hilum. Small noncalcified nodules are identified within the very anterior aspect of   the right lower lobe. No routine follow-up is recommended based upon the   most recent recommendations. See full recommendations below. RECOMMENDATIONS:   Multiple pulmonary nodules. Most severe: 5 mm right solid pulmonary nodule. No routine follow-up imaging is recommended per Fleischner Society Guidelines. These guidelines do not apply to immunocompromised patients and patients with   cancer. Follow up in patients with significant comorbidities as clinically   warranted. For lung cancer screening, adhere to Lung-RADS guidelines. Reference: Radiology. 2017; 284(1):228-43. XR WRIST RIGHT (MIN 3 VIEWS)   Final Result   Interval partial reduction of the distal right radial and ulnar fractures. XR CHEST 1 VIEW   Final Result   Right perihilar opacity. Correlate with presentation. CT WRIST RIGHT WO CONTRAST   Final Result   1. Acute and displaced fractures of the distal radius and distal ulna with   impaction at the fracture site and pronounced malalignment of the distal   ulnar fracture and distal radioulnar joint. 2. Soft tissue edema and subcutaneous gas compatible with open fracture. 3. Severe osteopenia.    4. 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   5. Chondrocalcinosis. CT HEAD WO CONTRAST   Final Result   No acute intracranial abnormality. Specifically, no acute intracranial   hemorrhage or mass effect. Chronic small vessel ischemic disease. CT PELVIS WO CONTRAST Additional Contrast? None   Final Result   No acute fracture is identified with the pelvis and within the hips. Amorphous calcification adjacent the right greater trochanter, which raise   the possibility of calcium hydroxyapatite deposition, which can be a   generator of pain. CT CERVICAL SPINE WO CONTRAST   Final Result   No acute fracture or traumatic malalignment. Basilar invagination of the dens, with some erosions seen at the atlantoaxial   joint, which raises the possibility of an erosive arthropathy. XR RADIUS ULNA RIGHT (2 VIEWS)   Final Result   Acute displaced distal radius and ulnar fractures. Assessment/Plan:    Active Hospital Problems    Diagnosis     Open wrist fracture, right, initial encounter [S62.101B]     Type I or II open Galeazzi's fracture of radius [S52.379B]     Wrist fracture [S62.109A]     HTN (hypertension) [I10]     Wrist fracture, closed, right, initial encounter [S62.101A]      1. Wrist fracture, postop day #1-doing well. Discharge planning for tomorrow   2. Essential hypertension, resume p.o. medications  3. Hypomagnesemia-resolved magnesium level is 2.10      Diet: ADULT DIET;  Regular  Code Status: Full Code        Steven Gomez MD

## 2022-02-28 NOTE — PROGRESS NOTES
Patient having really bad knee pain, requesting cream/gel to apply to knees. Patient also mentioned feeling constipated or like \"its there but she cant poop\". Perfect served Hospitalist about the issues above, new ordered in for arthritis and constipation. Will continue to monitor and reassess.

## 2022-02-28 NOTE — PLAN OF CARE
Problem: Pain:  Goal: Pain level will decrease  Description: Pain level will decrease  2/28/2022 0757 by Mily Taveras RN  Outcome: Ongoing     Problem: Pain:  Goal: Control of acute pain  Description: Control of acute pain  2/28/2022 0757 by Mily Taveras RN  Outcome: Ongoing     Problem: Pain:  Goal: Control of chronic pain  Description: Control of chronic pain  2/28/2022 0757 by Mily Taveras RN  Outcome: Ongoing     Problem: Falls - Risk of:  Goal: Will remain free from falls  Description: Will remain free from falls  2/28/2022 0757 by Mily Taveras RN  Outcome: Ongoing     Problem: Falls - Risk of:  Goal: Absence of physical injury  Description: Absence of physical injury  2/28/2022 0757 by Mily Taveras RN  Outcome: Ongoing     Problem: Falls - Risk of:  Goal: Absence of physical injury  Description: Absence of physical injury  2/28/2022 0757 by Mily Taveras RN  Outcome: Ongoing     Problem: Skin Integrity:  Goal: Will show no infection signs and symptoms  Description: Will show no infection signs and symptoms  2/28/2022 0757 by Mily Taveras RN  Outcome: Ongoing     Problem: Skin Integrity:  Goal: Absence of new skin breakdown  Description: Absence of new skin breakdown  2/28/2022 0757 by Mily Taveras RN  Outcome: Ongoing

## 2022-02-28 NOTE — PROGRESS NOTES
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 Current research shows that an AM-PAC score of 17 or less is typically not associated with a discharge to the patient's home setting. Based on the patient's AM-PAC score and their current functional mobility deficits, it is recommended that the patient have 3-5 sessions per week of Physical Therapy at d/c to increase the patient's independence. Body structures, Functions, Activity limitations: Decreased functional mobility ; Decreased balance;Decreased ADL status; Decreased ROM; Decreased posture  Prognosis: Fair  Decision Making: Medium Complexity  REQUIRES PT FOLLOW UP: Yes  Activity Tolerance  Activity Tolerance: Patient Tolerated treatment well       Patient Diagnosis(es): The encounter diagnosis was Open wrist fracture, right, initial encounter. has a past medical history of Hypertension. has a past surgical history that includes fracture surgery and Forearm surgery (Right, 2022).     Restrictions  Restrictions/Precautions  Restrictions/Precautions: Weight Bearing,Fall Risk  Position Activity Restriction  Other position/activity restrictions: NWB right wrist and forearm  Vision/Hearing  Vision: Within Functional Limits  Hearing: Exceptions to [facility]  Hearing Exceptions: Hard of hearing/hearing concerns     Subjective  General  Chart Reviewed: Yes  Patient assessed for rehabilitation services?: Yes  Additional Pertinent Hx: here due to fall at home while putting away items to refrigerator - lost balance and landed to right hand  Response To Previous Treatment: Not applicable  Family / Caregiver Present: No  Follows Commands: Within Functional Limits  Pain Screening  Patient Currently in Pain: Yes  Orientation  Orientation  Overall Orientation Status: Within Functional Limits  Social/Functional History  Social/Functional History  Lives With: Alone  Type of Home: House  Home Layout: Two level,Able to Live on Main level with bedroom/bathroom  Home Access: Stairs to enter without rails  Entrance Stairs - Number of Steps: 1 to garage  Bathroom Shower/Tub: Tub/Shower unit  Bathroom Toilet: Standard  Bathroom Equipment: Shower chair  Bathroom Accessibility: Walker accessible  Home Equipment: Cane,Rolling walker,Alert Button  ADL Assistance: [address] [facility] Responsibilities:  (does her cooking -)  Ambulation Assistance: Independent  Transfer Assistance: Independent  Active : Yes  Objective  ROM and strength non-surgical limbs grossly functional as observed mobilizing from bed  Motor Control  Gross Motor?: WFL  Sensation  Overall Sensation Status: WFL  Bed mobility  Supine to Sit: Minimal assistance; Moderate assistance;2 Person assistance  Sit to Supine: Unable to assess  Transfers  Sit to Stand: Minimal Assistance; Moderate Assistance;2 Person Assistance (in stedy - not able to gain a full upright stance)  Stand to sit: Minimal Assistance; Moderate Assistance;2 Person Assistance  Ambulation  Ambulation?: No (up to quad cane but not able to gain a stable static stance to attempt to ambulate)  Stairs/Curb  Stairs?: No     Balance  Comments: midline in sitting with close cga for safety    - near midline in stance in stedy with left hand hold to crossbar but not able to reach full upright stance  Exercises  Ankle Pumps: 30 per hour in easy pace     Plan   Plan  Times per week: 3-5 while on acute floor  Current Treatment Recommendations: Transfer Training,Patient/Caregiver Education & Training,ADL/Self-care Training,Modalities,Gait Training,Positioning  Safety Devices  Type of devices:  All fall risk precautions in place,Call light within reach,Left in chair,Nurse notified,Chair alarm in place [place] Leys)    AM-PAC Score  AM-PAC Inpatient Mobility Raw Score : 9 (02/28/22 0840)  AM-PAC Inpatient T-Scale Score : 30.55 (02/28/22 0840)  Mobility Inpatient CMS 0-100% Score: 81.38 (02/28/22 0840)  Mobility Inpatient CMS G-Code Modifier : CM (02/28/22 0840)    Goals  Short term goals  Time Frame for Short term goals: 1-2 days  Short term goal 1: bed mobility at mod assist  Short term goal 2: transfers at mod assist  Short term goal 3: ambulation at mod assist with quad cane on left for 15--20 feet  Patient Goals   Patient goals : pain relief and get well       Therapy Time   Individual Concurrent Group Co-treatment   Time In 0800         Time Out 0840         Minutes [address] [facility] Jackie Nunez, PT

## 2022-02-28 NOTE — PROGRESS NOTES
[facility] Orthopedic Surgery  Progress Note        POD # 1, s/p I&D and ORIF right distal radius open fracture. Pt comfortable, no c/o. Splint right wrist D/C/I,  No pain with right fingers ROM, NVI    CBC:   Lab Results   Component Value Date    WBC 6.2 02/28/2022    RBC 3.71 02/28/2022    HGB 11.1 02/28/2022    HCT 33.8 02/28/2022    MCV 91.1 02/28/2022    MCH 29.8 02/28/2022    MCHC 32.7 02/28/2022    RDW 13.3 02/28/2022     02/28/2022    MPV 7.0 02/28/2022     PT/INR:    Lab Results   Component Value Date    PROTIME 12.0 02/26/2022    INR 1.06 02/26/2022     PTT:  No results found for: APTT[APTT    A/P: s/p I&D and ORIF right distal radius open fracture. - Stable  - PT/OT, NWB right wrist  - Continue IV ABX today.  - Ok to D/C to [facility] tomorrow from ortho standpoint.  - F/U Dr Nahun Whitaker in 2 weeks.       Yinka Villegas MD 2/28/2022 7:15 AM

## 2022-02-28 NOTE — PROGRESS NOTES
Patient up in chair, awake, a&ox4. Patient worked with PT/OT this morning with assistance and a stedy x 1-2. Tolerated well. Patient IV flushed and infusing. Patient denies n/v/d at this time. Patient moreno cath removed this morning, and patient attempting to urinate now. Patient surgical incision to right wrist does have some break through bleeding. Patient rating pain at this time 2/10. Will continue to round on patient per unit protocol. Call light and bedside table within reach. Will continue to monitor and reassess.

## 2022-02-28 NOTE — DISCHARGE INSTR - COC
[facility] ([facility]) Continuity of Care Form    Patient Name:  Scot Dozier  : 10/17/1930    MRN:  [id number]    Admit date:  2022  Discharge date:  3/1/2022    Code Status Order: Full Code  Advance Directives: {YES OR NO:}    Admitting Physician: Lorri Farias DO  PCP: Ramiro Sparks    Discharging Nurse: Amber Patel Unit/Room#: P5M-3251/3126-01  Discharging Unit Phone Number: [phone]    Emergency Contact:        Past Surgical History:  Past Surgical History:   Procedure Laterality Date    FOREARM SURGERY Right 2022    RADIUS OPEN REDUCTION INTERNAL FIXATION performed by Charity Florence MD at [address] [place] [address] Road         Immunization History:   Immunization History   Administered Date(s) Administered    COVID-19, Pfizer Purple top, DILUTE for use, 12+ yrs, 30mcg/0.3mL dose 2021       Active Problems:  Principal Problem:    Wrist fracture, closed, right, initial encounter  Active Problems:    Wrist fracture    HTN (hypertension)    Open wrist fracture, right, initial encounter    Type I or II open Galeazzi's fracture of radius  Resolved Problems:    * No resolved hospital problems.  *      Isolation/Infection:       Nurse Assessment:  Last Vital Signs:BP (!) 148/70   Pulse 101   Temp 100.1 °F (37.8 °C) (Oral)   Resp 15   Ht 5' 5\" (1.651 m)   Wt 92 lb 6 oz (41.9 kg)   SpO2 96%   BMI 15.37 kg/m²   Last documented pain score (0-10 scale): Pain Level: 3  Last Weight:   Wt Readings from Last 1 Encounters:   22 92 lb 6 oz (41.9 kg)     Mental Status:  oriented, alert, coherent, logical, thought processes intact, and able to concentrate and follow conversation     IV Access:  - None    Nursing Mobility/ADLs:  Walking   Dependent  Transfer  Dependent  Bathing  Dependent  Dressing  Assisted  Toileting  [address] [facility] Delivery   whole    Wound Care Documentation and Therapy:  Keep right wrist splint and ace clean and dry. DO NOT remove    Date of Last BM: ***    Intake/Output Summary (Last 24 hours)     Intake/Output Summary (Last 24 hours) at 2/28/2022 1550  Last data filed at 2/28/2022 1256  Gross per 24 hour   Intake 2767.22 ml   Output 2150 ml   Net 617.22 ml     Safety Concerns:     History of Falls (last 30 days) and At Risk for Falls    Impairments/Disabilities:      Vision and Hearing    Nutrition Therapy:  Current Nutrition Therapy: ADULT DIET; Regular  Routes of Feeding: Oral  Liquids: No Restrictions  Daily Fluid Restriction: no  Last Modified Barium Swallow with Video (Video Swallowing Test): not done    Treatments at the Time of Hospital Discharge:   Respiratory Treatments: ***  Oxygen Therapy:  is not on home oxygen therapy. Ventilator:    - No ventilator support    Lab orders for discharge:        Rehab Therapies: Physical Therapy, Occupational Therapy and nursing care  Weight Bearing Status/Restrictions: Strict nonweightbearing on right hand  Other Medical Equipment (for information only, NOT a DME order): Other Treatments: Elevate right hand on pillow in bed or chair. Patient's personal belongings (please select all that are sent with patient):  Dentures upper and lower    RN SIGNATURE:  Electronically signed by Shey Ruiz RN on 3/1/22 at 6:59 PM EST    PHYSICIAN SECTION    Prognosis: Good    Condition at Discharge: Stable    Rehab Potential (if transferring to Rehab): Good    Physician Certification: I certify the above orders, information, and transfer of Fabby Manjarrez is necessary for the continuing treatment of the diagnosis listed and that he requires [place] for less 30 days.      Update Admission H&P: No change in H&P    PHYSICIAN SIGNATURE:  Electronically signed by NATA Liang CNP on 2/28/22 at 3:52 PM EST/ Dr Juan David Montejo Status Date: ***    [facility] Readmission Risk Assessment Score:    Discharging to Facility/ Agency   Name: [facility]  Address:  [address]   Phone:  [phone]  Fax:  [phone]    Dialysis Facility (if applicable)   Name:  Address:  Dialysis Schedule:  Phone:  Fax:    / signature: Electronically signed by Kesha Gutierrez on 3/1/22 at 4:33 PM EST          Followup Dr Lucina Gray in 2 weeks   [place] [address] and Sports Medicine, [address] [address],   [phone]

## 2022-02-28 NOTE — PROGRESS NOTES
Occupational Therapy   Occupational Therapy Initial Assessment  Date: 2022   Patient Name: Scot Dozier  MRN: [id number]     : 10/17/1930    Date of Service: 2022    Discharge Recommendations:  3-5 sessions per week,Patient would benefit from continued therapy after discharge       Scot Dozier scored a  on the AM-PAC ADL Inpatient form. Current research shows that an AM-PAC score of 17 or less is typically not associated with a discharge to the patient's home setting. Based on the patient's AM-PAC score and their current ADL deficits, it is recommended that the patient have 3-5 sessions per week of Occupational Therapy at d/c to increase the patient's independence. Please see assessment section for further patient specific details. If patient discharges prior to next session this note will serve as a discharge summary. Please see below for the latest assessment towards goals. Assessment   Performance deficits / Impairments: Decreased functional mobility ; Decreased balance;Decreased coordination;Decreased ADL status; Decreased endurance;Decreased ROM; Decreased high-level IADLs;Decreased strength  Assessment: 79 yo female admitted for a fall resulting in OPEN FRACTURE RIGHT DISTAL RADIUS PERIPROSTHETIC GALEAZZI FRACTURE DISLOCATION. s/p  ORIF Right distal radius shaft Galeazzi comminutedperiprosthetic fracture dislocation grade 2 open fracture. PMH: HTN. PTA, pt lives alone and was fully IND ADL, fxl mobility cane, and IADLs. Today, pt functioning well below baseline most limited by RUE NWB and decreased balance, endurance. Pt required Min/Mod A x2 for bed mobility and sit><stand to cane and stedy. Pt unable to achieve upright stance to cane. Pt required Total A fxl mobility EOB>recliner. RUE WFL elbow and shoulder, digits swollen with limited AROM. Anticipate Max/Total A LB and Mod/Max A UB ADL based on balance, endurance, RUE NWB, cognition and pain.  Cont acute OT to address above deficits. Rec OT 3-5x/week as pt is a high fall risk with LOW AMPAC score not associated with safe return home  Treatment Diagnosis: impaired ADL/fxl mobility  Prognosis: Good;Fair  Decision Making: Medium Complexity  OT Education: OT Role;Transfer Training;Plan of Care;Precautions  REQUIRES OT FOLLOW UP: Yes  Activity Tolerance  Activity Tolerance: Patient Tolerated treatment well  Safety Devices  Safety Devices in place: Yes  Type of devices: Nurse notified;Gait belt;Call light within reach; Chair alarm in place; Left in chair;Patient at risk for falls         Patient Diagnosis(es): The encounter diagnosis was Open wrist fracture, right, initial encounter. has a past medical history of Hypertension. has a past surgical history that includes fracture surgery and Forearm surgery (Right, 2/27/2022). Treatment Diagnosis: impaired ADL/fxl mobility      Restrictions  Restrictions/Precautions  Restrictions/Precautions: Weight Bearing,Fall Risk  Upper Extremity Weight Bearing Restrictions  Right Upper Extremity Weight Bearing: Non Weight Bearing  Position Activity Restriction  Other position/activity restrictions: NWB right wrist and forearm    Subjective   General  Chart Reviewed: Yes  Patient assessed for rehabilitation services?: Yes  Additional Pertinent Hx: 81 yo female admitted for a fall resulting in [address] s/p 2/27 ORIF Right distal radius shaft Galeazzi comminutedperiprosthetic fracture dislocation grade 2 open fracture. PMH: HTN  Family / Caregiver Present: No  Referring Practitioner: Yajaira Mcconnell MD  Diagnosis: R radius fx  Subjective  Subjective: Pt resting in bed upon arrival and agreeable to OT/PT eval. Pt reporting R wrist \"aching\".  Pt [place]  General Comment  Comments: RN ok to see    Social/Functional History  Social/Functional History  Lives With: Alone  Type of Home: House  Home Layout: Two level,Able to Live on Main level with bedroom/bathroom  Home Access: Stairs to enter without rails  Entrance Stairs - Number of Steps: 1 to garage  Bathroom Shower/Tub: Tub/Shower unit  Bathroom Toilet: Standard  Bathroom Equipment: Shower chair  Bathroom Accessibility: Walker accessible  Home Equipment: Cane,Rolling walker,Alert Button  ADL Assistance: Independent  Homemaking Assistance:  (does cooking and occasional laundry. Dtr assist with laundry and cleaning as needed)  Homemaking Responsibilities:  (does her cooking -)  Ambulation Assistance: Independent  Transfer Assistance: Independent  Active : Yes  Additional Comments: fall brought here       Objective   Vision: Within Functional Limits  Hearing: Exceptions to [facility]  Hearing Exceptions: Hard of hearing/hearing concerns    Orientation  Overall Orientation Status: Within Functional Limits  Observation/Palpation  Observation: RUE digit swelling  Balance  Sitting Balance: Contact guard assistance (at EOB)  Standing Balance: Moderate assistance (within stedy LUE on bar)  Functional Mobility  Assist Level: Dependent/Total  Functional Mobility Comments: EOB>recliner with use of vazquez stedy. unable to stand to cane  ADL  LE Dressing: Maximum assistance (socks)  Toileting: Dependent/Total (catheter)  Additional Comments: Anticipate Max/Total A LB and Mod/Max A UB ADL based on balance, endurance, RUE NWB, cognition and pain  Tone RUE  RUE Tone: Normotonic  Tone LUE  LUE Tone: Normotonic  Coordination  Coordination and Movement description: Fine motor impairments;Right UE (d/t swelling in digits)     Bed mobility  Supine to Sit: Minimal assistance; Moderate assistance;2 Person assistance  Sit to Supine: Unable to assess  Transfers  Sit to stand: Minimal assistance; Moderate assistance;2 Person assistance  Stand to sit: Minimal assistance; Moderate assistance;2 Person assistance  Transfer Comments: EOB>quad cane, unable to acheive upright stance.  EOB>stedy with LUE on bar     Cognition  Overall Cognitive Status: Exceptions  Attention Span: Attends with cues to redirect  Initiation: Requires cues for some  Sequencing: Requires cues for some        Sensation  Overall Sensation Status: WFL  Type of ROM/Therapeutic Exercise  Comment: seated RUE AROM  Exercises  Finger Flexion: 8  Finger Extension: 8     LUE AROM (degrees)  LUE AROM : WFL  RUE AROM (degrees)  RUE General AROM: full digit flex/ext with increased time (weak ), no opposition. shoulder and elbow WFL  LUE Strength  Gross LUE Strength: WFL                Plan   Plan  Times per week: 3-5  Current Treatment Recommendations: Strengthening,Endurance Training,Patient/Caregiver Education & Training,ROM,Self-Care / ADL,Balance Training,Pain Management,Functional Mobility Training,Safety Education & Training,Positioning    AM-PAC Score        AM-[facility] Inpatient Daily Activity Raw Score: 12 (02/28/22 1202)  AM-PAC Inpatient ADL T-Scale Score : 30.6 (02/28/22 1202)  ADL Inpatient CMS 0-100% Score: 66.57 (02/28/22 1202)  ADL Inpatient CMS G-Code Modifier : CL (02/28/22 1202)    Goals  Short term goals  Time Frame for Short term goals: prior to d/c  Short term goal 1: bed mobility Min A  Short term goal 2: ADL sit><stand Min A  Short term goal 3: toileting Mod A  Short term goal 4: UB bathing/dressing Min A  Short term goal 5: short fxl mobility with AD Min A  Patient Goals   Patient goals : return home       Therapy Time   Individual Concurrent Group Co-treatment   Time In 0755         Time Out 0835         Minutes 40         Timed Code Treatment Minutes: 25 Minutes (15 eval. 25 TA)       2nd Session [id number] (1 ADL charge)  Call received from pt's PCA requesting assist with tx pt from recliner. Upon my arrival, pt in restroom with PCA (reported use of stedy with assist x3). Pt reporting BLE knee stiffness and pain. Pt required Mod/Max A x2 sit>stand within stedy from toilet, Max A toileting and Total A LB dressing (brief). Pt required Max A x2 sit>sup.  Pt resting in bed at EOS with PCA and all needs met.      Kim Motta, OTD, OTR/L

## 2022-02-28 NOTE — PLAN OF CARE
Problem: Pain:  Goal: Pain level will decrease  Description: Pain level will decrease  2/27/2022 2120 by Jj [place] RN  Outcome: Ongoing  Note: Pt educated to attempt non-phagological method of pain control, but it it becomes too strong use PRN analgesics. Pain and discomfort being managed PRN analgesics per MD orders. Pt able to express presence of pain. Problem: Pain:  Goal: Control of acute pain  Description: Control of acute pain  2/27/2022 2120 by Jj [place] RN  Outcome: Ongoing  Note: Patient educated on acute pain. Taught patient to use call light to ask for pain medication. PRN pain medication given for acute pain. Will continue to monitor pain per unit protocol. Problem: Pain:  Goal: Control of chronic pain  Description: Control of chronic pain  2/27/2022 2120 by Jj [place] RN  Outcome: Ongoing  Note: Patient educated on chronic pain. Taught patient to use call light to ask for pain medication. PRN pain medication given for chronic pain. Will continue to monitor pain per unit protocol. Problem: Falls - Risk of:  Goal: Will remain free from falls  Description: Will remain free from falls  2/27/2022 2120 by Jj [place] RN  Outcome: Ongoing  Note: Fall risk assessment completed . Fall precautions in place, bed/ chair alarm on, side rails 2/4 up, call light in reach, educated pt on calling for assistance when needed, room clear of clutter. Pt verbalized understanding. Problem: Falls - Risk of:  Goal: Absence of physical injury  Description: Absence of physical injury  2/27/2022 2120 by Jj [place] RN  Outcome: Ongoing  Note: Patient remains free from physical injury. Patient educated on safety precautions. Will continue to monitor to ensure patient remains free from physical injury throughout remainder of shift.        Problem: Skin Integrity:  Goal: Will show no infection signs and symptoms  Description: Will show no infection signs and symptoms  2/27/2022 2120 by Kulwinder Chan RN  Outcome: Ongoing  Note: Patient shows no signs or symptoms of urinary tract infection at this time. Will continue to monitor throughout shift. Problem: Skin Integrity:  Goal: Absence of new skin breakdown  Description: Absence of new skin breakdown  2/27/2022 2120 by Kulwinder Chan RN  Outcome: Ongoing  Note: Skin assessment complete. No new signs of skin breakdown noted. Repositioning patient with pillows at two hour intervals. Heels elevated off bed.

## 2022-02-28 NOTE — CARE COORDINATION
INITIAL CASE MANAGEMENT ASSESSMENT    Reviewed chart, met with patient, daughter, Syeda Collins and niece present in room to assess possible discharge needs. Explained Case Management role/services. Living Situation: Lives alone in two story home; stays on main level; has supportive family in the area. ADLs: manages personal care on her own; daughter assists with laundry and cooking     DME: cane, RW, shower chair, alert button    PT/OT Recs: 3-5 sessions which is consistent with skilled nursing home placement     Active Services: none     Transportation: does drive; or daughter     Medications: takes on her own    PCP: Enid Chavira    PLAN/COMMENTS: Spoke with patient, daughter,niece regarding snf recommendation; provided snf list. Explained medicare snf benefit. They prefer [employer] [address] or [employer]. Referrals made to [facility] and [facility] The Plan for Transition of Care is related to the following treatment goals: snf    The Patient and/or patient representative daughter was provided with a choice of provider and agrees   with the discharge plan. [x] Yes [] No    Freedom of choice list was provided with basic dialogue that supports the patient's individualized plan of care/goals, treatment preferences and shares the quality data associated with the providers. [x] Yes [] No    SW/CM provided contact information for patient or family to call with any questions. SW/CM will follow and assist as needed.     Electronically signed by MAGED Murdock, FAMILIA, Case Management on 2/28/2022 at [place] [phone]

## 2022-02-28 NOTE — OP NOTE
0 [address] Afshan Mendoza                                 OPERATIVE REPORT    PATIENT NAME: Delgado Collins                       :        10/17/1930  MED REC NO:   [id number]                          ROOM:       3126  ACCOUNT NO:   [de-identified]                           ADMIT DATE: 2022  PROVIDER:     Bolivar Avila MD      DATE OF PROCEDURE:  2022    PRIMARY CARE PROVIDER:  Dr. Syed Crespo. PREOPERATIVE DIAGNOSIS:  Right distal radius shaft Galeazzi comminuted  periprosthetic fracture dislocation grade 2 open fracture. POSTOPERATIVE DIAGNOSIS:  Right distal radius shaft Galeazzi comminuted  periprosthetic fracture dislocation grade 2 open fracture. OPERATION PERFORMED:  1. Open treatment of right distal radius Galeazzi grade 2 open fracture  dislocation periprosthetic distal radius plate with internal fixation and  closed treatment of distal radial ulnar joint and distal ulnar  fracture. 2.  Debridement of open fracture including skin, subcutaneous tissue,  fascia and bone right distal ulnar fracture. SURGEON:  Bolivar Avila MD    ASSISTANT:  Maya Amin, Surgical Assistant. ANESTHESIA:  General anesthesia. ESTIMATED BLOOD LOSS:  Minimal.    COMPLICATIONS:  None. TOURNIQUET:  Right upper arm, 250 mmHg. IMPLANTS USED:  Odliia mini frag 2.7 eight hole plate placed dorsally  with four screws distally and four screws proximally. INDICATIONS:  This is a 57-year-old white female, right-hand dominant,  who is still fairly active for her age who had a previous distal radius  fracture over ten years ago sustained a fall in her kitchen with right  wrist pain. She was found to have an open distal radius severely  comminuted periprosthetic, proximal to the previous plate, fracture  including fracture of the distal ulna and dislocation of the radioulnar  joint.   All risks, benefits and alternatives were discussed with the  patient and her daughter and they agreed to proceed with surgical  fixation and debridement of open fracture. Given the patient's comminuted periprosthetic fracture added significant challenge to the procedure. It required significant physical and mental effort. It required 80% more time for such procedure. OPERATIVE PROCEDURE:  The patient's right wrist was marked. She was on  IV antibiotic but she received 2 gm of Ancef IV preoperatively. The  patient was then brought to the operating room and underwent general  anesthesia. A well-padded tourniquet was placed, right upper arm. The  right upper extremity was then prepped and draped in regular sterile  routine fashion. A time-out was called confirming the patient's name,  site and procedure. The right arm was elevated for a few minutes. Tourniquet was inflated  to 250 mmHg. We started with debridement of the open fracture. We used  the 15 blade and sharply debrided the skin, subcutaneous tissue, and  fascia down to the bone. At the end of the procedure, we found a piece  of significant skin inside the wound that was also removed. We  irrigated the wound copiously with normal saline mixed with gentamicin. After we finished the debridement of the open fracture, we turned  attention towards the periprosthetic Galeazzi fracture. Given that she  had a previous plate that was over [de-identified] years old, we elected to go  dorsally as she had more ulnar side shaft as well as distal bone that  could be used for fixation. Incision was made over the dorsal aspect of  the right distal radius. Careful dissection was performed. We  identified the first as well as the second and third dorsal  compartments. We preserved the first dorsal compartment, and we  dissected down subperiosteally exposing the dorsal distal radius. We  carefully identified the fracture, which was found to be severely  comminuted.   It was significantly challenging, physically and mentally  very difficult given the comminution of the fracture which makes it very  hard to key in the fracture. We were able to pull the plate to  appropriate length and by adjusting the plate so that it tips more  ulnarly to avoid the old plate. We were able to put one nonlocking  screw distally. Proximally, we had to reduce the fracture. We placed  another nonlocking screw. Overall, we were very satisfied with the  reduction and position of the plate at this point. We went ahead and  locked it in place with three more locking screws distally and three  more locking screws proximally. Overall, we were very satisfied with  the re-alignment of the fracture. At this point, we turned back to the  irrigation. We irrigated the dorsal incision with normal saline mixed  with gentamicin as well as the volar wound. At this point, we found  skin was stuck inside the wound that was removed. We closed the subcu  with 3-0 Vicryl and the skin with 4-0 nylon on the dorsal aspect and in  the volar aspect we closed the skin with 3-0 nylon. Dressing was then  applied in the form of Xeroform, 4x4, sterile Webril and a splint was  applied. The patient tolerated the procedure well, was taken to the recovery in  stable condition. POSTOPERATIVE PLAN:  The patient will be readmitted as an inpatient. She should be nonweightbearing for at least six to eight weeks. She  will be very careful about the risks given that she has significant  comminution as well as osteoporosis and will be very careful about  bearing any weight for at least six weeks but we can start gentle range  of motion in two weeks. Trevor Adrian MD    D: 02/27/2022 14:25:39       T: 02/27/2022 21:08:21     /[email or web address]  Job#: [id number]     Doc#: [id number]    CC:   Camille Hernandez

## 2022-03-01 VITALS
WEIGHT: 95.02 LBS | HEART RATE: 99 BPM | BODY MASS INDEX: 15.83 KG/M2 | HEIGHT: 65 IN | OXYGEN SATURATION: 97 % | DIASTOLIC BLOOD PRESSURE: 78 MMHG | RESPIRATION RATE: 18 BRPM | TEMPERATURE: 99.2 F | SYSTOLIC BLOOD PRESSURE: 143 MMHG

## 2022-03-01 LAB
ANION GAP SERPL CALCULATED.3IONS-SCNC: 11 MMOL/L (ref 3–16)
BASOPHILS ABSOLUTE: 0 K/UL (ref 0–0.2)
BASOPHILS RELATIVE PERCENT: 0.2 %
BUN BLDV-MCNC: 16 MG/DL (ref 7–20)
CALCIUM SERPL-MCNC: 8.4 MG/DL (ref 8.3–10.6)
CHLORIDE BLD-SCNC: 96 MMOL/L (ref 99–110)
CO2: 25 MMOL/L (ref 21–32)
CREAT SERPL-MCNC: 0.6 MG/DL (ref 0.6–1.2)
EOSINOPHILS ABSOLUTE: 0 K/UL (ref 0–0.6)
EOSINOPHILS RELATIVE PERCENT: 0.1 %
GFR AFRICAN AMERICAN: >60
GFR NON-AFRICAN AMERICAN: >60
GLUCOSE BLD-MCNC: 101 MG/DL (ref 70–99)
HCT VFR BLD CALC: 33.9 % (ref 36–48)
HEMOGLOBIN: 11.2 G/DL (ref 12–16)
LYMPHOCYTES ABSOLUTE: 1.1 K/UL (ref 1–5.1)
LYMPHOCYTES RELATIVE PERCENT: 19.3 %
MAGNESIUM: 2 MG/DL (ref 1.8–2.4)
MCH RBC QN AUTO: 30 PG (ref 26–34)
MCHC RBC AUTO-ENTMCNC: 33.2 G/DL (ref 31–36)
MCV RBC AUTO: 90.5 FL (ref 80–100)
MONOCYTES ABSOLUTE: 0.6 K/UL (ref 0–1.3)
MONOCYTES RELATIVE PERCENT: 10.3 %
NEUTROPHILS ABSOLUTE: 3.9 K/UL (ref 1.7–7.7)
NEUTROPHILS RELATIVE PERCENT: 70.1 %
PDW BLD-RTO: 13.2 % (ref 12.4–15.4)
PLATELET # BLD: 168 K/UL (ref 135–450)
PMV BLD AUTO: 7.4 FL (ref 5–10.5)
POTASSIUM SERPL-SCNC: 3.7 MMOL/L (ref 3.5–5.1)
RBC # BLD: 3.74 M/UL (ref 4–5.2)
SARS-COV-2, NAAT: NOT DETECTED
SODIUM BLD-SCNC: 132 MMOL/L (ref 136–145)
WBC # BLD: 5.5 K/UL (ref 4–11)

## 2022-03-01 PROCEDURE — 83735 ASSAY OF MAGNESIUM: CPT

## 2022-03-01 PROCEDURE — 2580000003 HC RX 258: Performed by: ORTHOPAEDIC SURGERY

## 2022-03-01 PROCEDURE — 85025 COMPLETE CBC W/AUTO DIFF WBC: CPT

## 2022-03-01 PROCEDURE — 87635 SARS-COV-2 COVID-19 AMP PRB: CPT

## 2022-03-01 PROCEDURE — 36415 COLL VENOUS BLD VENIPUNCTURE: CPT

## 2022-03-01 PROCEDURE — 9990000010 HC NO CHARGE VISIT

## 2022-03-01 PROCEDURE — 6360000002 HC RX W HCPCS: Performed by: ORTHOPAEDIC SURGERY

## 2022-03-01 PROCEDURE — 80048 BASIC METABOLIC PNL TOTAL CA: CPT

## 2022-03-01 RX ORDER — DOCUSATE SODIUM 100 MG/1
100 CAPSULE, LIQUID FILLED ORAL 2 TIMES DAILY PRN
Qty: 30 CAPSULE | Refills: 0
Start: 2022-03-01

## 2022-03-01 RX ADMIN — DICLOFENAC SODIUM 2 G: 10 GEL TOPICAL at 08:25

## 2022-03-01 RX ADMIN — ENOXAPARIN SODIUM 30 MG: 100 INJECTION SUBCUTANEOUS at 08:24

## 2022-03-01 RX ADMIN — SODIUM CHLORIDE: 4.5 INJECTION, SOLUTION INTRAVENOUS at 08:32

## 2022-03-01 ASSESSMENT — PAIN SCALES - GENERAL: PAINLEVEL_OUTOF10: 0

## 2022-03-01 NOTE — CARE COORDINATION
Spoke with patient and daughter present in room regarding snf choice. They prefer [facility]. [facility] has a bed.      Electronically signed by MAGED Moreno, MICHAELW, Case Management on 3/1/2022 at 4:30 PM  [address] [facility] [phone]

## 2022-03-01 NOTE — PROGRESS NOTES
[facility] Orthopedic Surgery   Progress Note      S/P :  SUBJECTIVE  In bed. Alert and oriented . Pain is   described in right wrist and with the intensity of mild. Pain is described as aching. OBJECTIVE              Physical                      VITALS:  BP (!) 142/70   Pulse 96   Temp 99.4 °F (37.4 °C) (Oral)   Resp 17   Ht 5' 5\" (1.651 m)   Wt 95 lb 0.3 oz (43.1 kg)   SpO2 95%   BMI 15.81 kg/m²                     MUSCULOSKELETAL:  right foot NVI. Wiggles toes to command. Able to plantarflex and dorsiflex ankle Pedal pulses are palpable. NEUROLOGIC:                                  Sensory:  Touch:  Right Upper Extremity:  normal                                                 Surgical wound appears clean and dry. ACE was loose over splint. I changed to new ACE wraps with better fit. Tolerated well.  Hand elevated on pillow in bed.,     Data       CBC:   Lab Results   Component Value Date    WBC 5.5 03/01/2022    RBC 3.74 03/01/2022    HGB 11.2 03/01/2022    HCT 33.9 03/01/2022    MCV 90.5 03/01/2022    MCH 30.0 03/01/2022    MCHC 33.2 03/01/2022    RDW 13.2 03/01/2022     03/01/2022    MPV 7.4 03/01/2022        WBC:    Lab Results   Component Value Date    WBC 5.5 03/01/2022        Hemoglobin/Hematocrit:    Lab Results   Component Value Date    HGB 11.2 03/01/2022    HCT 33.9 03/01/2022        PT/INR:    Lab Results   Component Value Date    PROTIME 12.0 02/26/2022    INR 1.06 02/26/2022              Current Inpatient Medications             Current Facility-Administered Medications: diclofenac sodium (VOLTAREN) 1 % gel 2 g, 2 g, Topical, BID  docusate sodium (COLACE) capsule 100 mg, 100 mg, Oral, BID PRN  sodium chloride flush 0.9 % injection 5-40 mL, 5-40 mL, IntraVENous, 2 times per day  0.9 % sodium chloride infusion, 25 mL, IntraVENous, PRN  acetaminophen (TYLENOL) tablet 650 mg, 650 mg, Oral, Q6H PRN **OR** acetaminophen (TYLENOL) suppository 650 mg, 650 mg, Rectal, Q6H PRN  ondansetron (ZOFRAN) injection 4 mg, 4 mg, IntraVENous, Q6H PRN  morphine (PF) injection 2 mg, 2 mg, IntraVENous, Q4H PRN  enoxaparin (LOVENOX) injection 30 mg, 30 mg, SubCUTAneous, Daily  0.45 % sodium chloride infusion, , IntraVENous, Continuous  sodium chloride flush 0.9 % injection 5-40 mL, 5-40 mL, IntraVENous, PRN    ASSESSMENT AND PLAN    Fall at home  Right wrist pain  Right distal radius fx, post ORIF, stable exam  Plan NWB right hand, ROM exercises right fingers to reduce swelling and stiffness, Elevate right hand on pillow in bed or chair.    Keep splint and ACE clean and intact, do not remove  Followup Dr Jin Wu in office in 2 weeks      Landon Alvarado, NATA - CNP  3/1/2022  10:40 AM

## 2022-03-01 NOTE — PROGRESS NOTES
Physical Therapy    Nestor Graves  3/1/2022  Arrived to room as patient eating her lunch (up in recliner) - family in assisting. Will attempt later as schedule permits.   Electronically signed by Cleo Ly PT on 3/1/2022 at 1:33 PM

## 2022-03-01 NOTE — CARE COORDINATION
DISCHARGE SUMMARY     DATE OF DISCHARGE: 3/1/2022    DISCHARGE DESTINATION: skilled unit    Discharging to Facility/ Agency   · Name: [facility]  · Address:  [address]   · Phone:  [phone]  · Fax:  [phone]    NEY/KEISHA COMPLETED: yes    COVID RESULT: 3/1/2022 negative      TRANSPORTATION:     Company Name:  [address] up Time: 715pm    Phone Number: [phone]      COMMENTS: Informed patient and daughter of discharge time of 715pm today. Informed [employer] of discharge time. Rn aware. Report number [phone].     Electronically signed by MAGED Deluca, MICHAELW, Case Management on 3/1/2022 at 5:15 PM  [place] [id number]

## 2022-03-01 NOTE — CARE COORDINATION
[address] and [facility] do not have any beds. Spoke with daughter, Robert Davis, regarding above. She requested referrals to:   [facility], [facility] [address], [facility]--referrals sent. Electronically signed by MAGED Navas, FAMILIA, Case Management on 3/1/2022 at 11:00 AM  [place] [id number]    1:21 PM   [facility] has a semi-private room, [place] [address], [address] [facility] (patient's MD can see patient at [facility]), and [facility] have beds. [facility] does not. Spoke with patient and daughter present in room regarding above. They do not want a semi-private room. They are deciding between [facility] [address] [facility] and [place] [address]. Provided patient and daughter, Robert Davis, present in room with IMM letter.     Electronically signed by MAGED Navas, FAMILIA, Case Management on 3/1/2022 at 2:32 PM  [facility] [id number]

## 2022-03-01 NOTE — DISCHARGE SUMMARY
Hospital Medicine Discharge Summary    Patient ID: Marck Chanel      Patient's PCP: Abelino Gonzalez    Admit Date: 2/26/2022     Discharge Date:   3/1/2022      Admitting Provider: Fito You DO     Discharge Provider: Mariel Lora MD     Discharge Diagnoses:  Right distal radial fracture status post ORIF  Hypertension  Hypomagnesemia  Ambulatory dysfunction with fall  Cholelithiasis asymptomatic       The patient was seen and examined on day of discharge and this discharge summary is in conjunction with any daily progress note from day of discharge. Hospital Course: Patient is a 80-year-old female admitted to the hospital after fall. Patient was found to have a right distal radial fracture. Patient underwent successful ORIF by Dr. Lluvia Oates. Excellent postop course. She did have low magnesium which was repleted. She was restarted on her home medications. She was felt to be deconditioned and will go to a nursing home for rehabilitative restorative care  Follow-up Ortho 2 wks        Physical Exam Performed:     BP (!) 142/70   Pulse 96   Temp 99.4 °F (37.4 °C) (Oral)   Resp 17   Ht 5' 5\" (1.651 m)   Wt 95 lb 0.3 oz (43.1 kg)   SpO2 95%   BMI 15.81 kg/m²       General appearance:  No apparent distress, appears stated age and cooperative. HEENT:  Normal cephalic, atraumatic without obvious deformity. Pupils equal, round, and reactive to light. Extra ocular muscles intact. Conjunctivae/corneas clear. Neck: Supple, with full range of motion. No jugular venous distention. Trachea midline. Respiratory:  Normal respiratory effort. Clear to auscultation, bilaterally without Rales/Wheezes/Rhonchi. Cardiovascular:  Regular rate and rhythm with normal S1/S2 without murmurs, rubs or gallops. Abdomen: Soft, non-tender, non-distended with normal bowel sounds.   Musculoskeletal: Right forearm and wrist wrapped with Ace and splint  Skin: Skin color, texture, turgor normal.  No rashes or lesions. Neurologic:  Neurovascularly intact without any focal sensory/motor deficits. Cranial nerves: II-XII intact, grossly non-focal.  Psychiatric:  Alert and oriented, thought content appropriate, normal insight  Capillary Refill: Brisk,< 3 seconds   Peripheral Pulses: +2 palpable, equal bilaterally       Labs: For convenience and continuity at follow-up the following most recent labs are provided:      CBC:    Lab Results   Component Value Date    WBC 5.5 03/01/2022    HGB 11.2 03/01/2022    HCT 33.9 03/01/2022     03/01/2022       Renal:    Lab Results   Component Value Date     03/01/2022    K 3.7 03/01/2022    K 3.9 02/26/2022    CL 96 03/01/2022    CO2 25 03/01/2022    BUN 16 03/01/2022    CREATININE 0.6 03/01/2022    CALCIUM 8.4 03/01/2022         Significant Diagnostic Studies    Radiology:   XR WRIST RIGHT (MIN 3 VIEWS)   Final Result      FLUORO FOR SURGICAL PROCEDURES   Final Result      CT CHEST WO CONTRAST   Final Result   No acute abnormality identified. The opacity seen in the right perihilar region is most likely secondary to   superimposition of dense breast parenchyma with the right hilum. Small noncalcified nodules are identified within the very anterior aspect of   the right lower lobe. No routine follow-up is recommended based upon the   most recent recommendations. See full recommendations below. RECOMMENDATIONS:   Multiple pulmonary nodules. Most severe: 5 mm right solid pulmonary nodule. No routine follow-up imaging is recommended per Fleischner Society Guidelines. These guidelines do not apply to immunocompromised patients and patients with   cancer. Follow up in patients with significant comorbidities as clinically   warranted. For lung cancer screening, adhere to Lung-RADS guidelines. Reference: Radiology. 2017; 284(1):228-43.          XR WRIST RIGHT (MIN 3 VIEWS)   Final Result   Interval partial reduction of the distal right radial and ulnar fractures. XR CHEST 1 VIEW   Final Result   Right perihilar opacity. Correlate with presentation. CT WRIST RIGHT WO CONTRAST   Final Result   1. Acute and displaced fractures of the distal radius and distal ulna with   impaction at the fracture site and pronounced malalignment of the distal   ulnar fracture and distal radioulnar joint. 2. Soft tissue edema and subcutaneous gas compatible with open fracture. 3. Severe osteopenia. 4. 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   5. Chondrocalcinosis. CT HEAD WO CONTRAST   Final Result   No acute intracranial abnormality. Specifically, no acute intracranial   hemorrhage or mass effect. Chronic small vessel ischemic disease. CT PELVIS WO CONTRAST Additional Contrast? None   Final Result   No acute fracture is identified with the pelvis and within the hips. Amorphous calcification adjacent the right greater trochanter, which raise   the possibility of calcium hydroxyapatite deposition, which can be a   generator of pain. CT CERVICAL SPINE WO CONTRAST   Final Result   No acute fracture or traumatic malalignment. Basilar invagination of the dens, with some erosions seen at the atlantoaxial   joint, which raises the possibility of an erosive arthropathy. XR RADIUS ULNA RIGHT (2 VIEWS)   Final Result   Acute displaced distal radius and ulnar fractures.                 Consults:     IP CONSULT TO ORTHOPEDIC SURGERY  IP CONSULT TO ORTHOPEDIC SURGERY  IP CONSULT TO SPIRITUAL SERVICES  IP CONSULT TO SOCIAL WORK    Disposition:  skilled nursing facility      Condition at Discharge: Unstable    Discharge Instructions/Follow-up:  Ortho in 2 weeks    Code Status:  Full Code     Activity: activity as tolerated    Diet: cardiac diet      Discharge Medications:     Current Discharge Medication List           Details   brimonidine (ALPHAGAN) 0.2 % ophthalmic solution 1 drop in the morning and at bedtime metoprolol succinate (TOPROL XL) 50 MG extended release tablet Take 50 mg by mouth daily      triamterene-hydroCHLOROthiazide (DYAZIDE) 37.5-25 MG per capsule Take 1 capsule by mouth every morning      ferrous sulfate (IRON 325) 325 (65 Fe) MG tablet Take 325 mg by mouth daily (with breakfast)      Calcium Carb-Cholecalciferol (CALCIUM 1000 + D) 1000-800 MG-UNIT TABS Take 1 tablet by mouth daily      vitamin D (CHOLECALCIFEROL) 25 MCG (1000 UT) TABS tablet Take 2,000 Units by mouth every evening      vitamin C (ASCORBIC ACID) 500 MG tablet Take 500 mg by mouth daily      vitamin E 400 UNIT capsule Take 400 Units by mouth daily      B Complex Vitamins (B-COMPLEX/B-12) TABS Take 1 tablet by mouth every morning (before breakfast)             Time Spent on discharge is more than 45 minutes in the examination, evaluation, counseling and review of medications and discharge plan. Signed:    Tawana Kang MD   3/1/2022      Thank you Man Beny for the opportunity to be involved in this patient's care. If you have any questions or concerns please feel free to contact me at [phone].

## 2022-03-01 NOTE — FLOWSHEET NOTE
03/01/22 1432   IMM Letter   IMM Letter given to Patient/Family/Significant other/Guardian/POA/by: Provided patient and daughter, Joce Cason, present in room with IMM letter.       IMM Letter date given: 03/01/22   IMM Letter time given: 1320

## 2022-03-01 NOTE — PLAN OF CARE
Problem: Pain:  Goal: Pain level will decrease  Description: Pain level will decrease  3/1/2022 0806 by Caryl Cleaning RN  Outcome: Ongoing  Note: Pt assessed for pain. Pt in pain and assessed with 0-10 pain rating scale. Pt given prescribed analgesic for pain. (See eMar) Pt satisfied with pain relief thus far. Will reassess and continue to monitor. 2/28/2022 2329 by Velia Hazel RN  Outcome: Ongoing  Goal: Control of acute pain  Description: Control of acute pain  3/1/2022 0806 by Caryl Cleaning RN  Outcome: Ongoing  Note: Pt assessed for pain. Pt in pain and assessed with 0-10 pain rating scale. Pt given prescribed analgesic for pain. (See eMar) Pt satisfied with pain relief thus far. Will reassess and continue to monitor. 2/28/2022 2329 by Velia Hazel RN  Outcome: Ongoing  Goal: Control of chronic pain  Description: Control of chronic pain  3/1/2022 0806 by Caryl Cleaning RN  Outcome: Ongoing  Note: Pt assessed for pain. Pt in pain and assessed with 0-10 pain rating scale. Pt given prescribed analgesic for pain. (See eMar) Pt satisfied with pain relief thus far. Will reassess and continue to monitor. 2/28/2022 2329 by Velia Hazel RN  Outcome: Ongoing     Problem: Falls - Risk of:  Goal: Will remain free from falls  Description: Will remain free from falls  3/1/2022 0806 by Caryl Cleaning RN  Outcome: Ongoing  Note: Fall risk assessment completed. Fall precautions in place. Call light within reach. Pt educated on calling for assistance before getting up. Walkway free of clutter. Will continue to monitor. 2/28/2022 2329 by Velia Hazel RN  Outcome: Ongoing  Goal: Absence of physical injury  Description: Absence of physical injury  3/1/2022 0806 by Caryl Cleaning RN  Outcome: Ongoing  Note: Patient will remain free from physical injury. Safety precautions are in place. Will continue to monitor and assess.   2/28/2022 2329 by Velia Hazel RN  Outcome: Ongoing     Problem: Skin Integrity:  Goal: Will show no infection signs and symptoms  Description: Will show no infection signs and symptoms  3/1/2022 0806 by Lenny Tellez RN  Outcome: Ongoing  Note: Pt is free of signs and symptoms of infection. Incision and dressing are clean, dry and intact. Vital signs stable. Will monitor. 2/28/2022 2329 by Radha Abdi RN  Outcome: Ongoing  Goal: Absence of new skin breakdown  Description: Absence of new skin breakdown  3/1/2022 0806 by Lenny Tellez RN  Outcome: Ongoing  Note: Patient will remain free from new skin breakdown. Precautions in place. Will monitor and assess.   2/28/2022 2329 by Radha Abdi RN  Outcome: Ongoing

## 2022-03-01 NOTE — PROGRESS NOTES
Report called to [facility]. All questions and concerns were addressed. No further questions or needs at this time. Call back number given incase of any questions that arise.   Electronically signed by Alicia Brown RN on 3/1/2022 at 6:10 PM

## 2022-03-02 NOTE — PROGRESS NOTES
Pt discharged to [facility]. Transportation here with stretcher. Transported by [employer]. Discharge instructions, Rx, and personal belongings given to transporters. Explanation of discharge medications and instructions understood by verbal statement. No questions, comments or concerns at this time.    Electronically signed by Elicia Coulter RN on 3/1/2022 at 7:21 PM

## 2022-03-09 ENCOUNTER — OFFICE VISIT (OUTPATIENT)
Dept: ORTHOPEDIC SURGERY | Age: 87
End: 2022-03-09
Payer: MEDICARE

## 2022-03-09 VITALS — HEIGHT: 65 IN | BODY MASS INDEX: 15.81 KG/M2

## 2022-03-09 DIAGNOSIS — S52.371B: Primary | ICD-10-CM

## 2022-03-09 PROCEDURE — APPNB30 APP NON BILLABLE TIME 0-30 MINS: Performed by: NURSE PRACTITIONER

## 2022-03-09 PROCEDURE — 99024 POSTOP FOLLOW-UP VISIT: CPT | Performed by: NURSE PRACTITIONER

## 2022-03-09 PROCEDURE — L3908 WHO COCK-UP NONMOLDE PRE OTS: HCPCS | Performed by: NURSE PRACTITIONER

## 2022-03-10 NOTE — PROGRESS NOTES
DIAGNOSIS:  Right distal radius shaft Galeazzi comminuted periprosthetic fracture dislocation grade 2 open fracture, status post ORIF. DATE OF SURGERY:  2/27/2022. HISTORY OF PRESENT ILLNESS:  Ms. Cecile Davis 80 y.o.  female right handed returns today  for 2 weeks postoperative visit. The patient denies any significant pain in the right wrist.  Rates pain a 0-1/10 VAS mild, aching, intermittent and are improving. Aggravating factors movement. Alleviating factors rest.  No numbness or tingling sensation. No fever or Chills. She  is in a splint. She is in a rehab center. PHYSICAL EXAMINATION:  The incision is completely healed as well as the open fracture site. Sutures intact. No signs of any erythema or drainage. She  has no pain with the active or passive range of motion of the right wrist, but decrease ROM. She  has intact sensation, distally, and she  is neurovascularly intact. IMAGING:  Three views right wrist taken today in the office showed anatomic alignment of right distal radius Galeazzi fracture with volar and radial fracture plate and screws in good position, no loosening. IMPRESSION:  2 weeks out from Right distal radius shaft Galeazzi comminuted periprosthetic fracture dislocation grade 2 open fracture    PLAN:  I have told the patient to work on gentle ROM,NWB right hand. A forearm brace applied and she was instructed to wear like a cast and only remove for shower the next 4 weeks. In 4 weeks, she can remove the brace for sleep, shower and rest. She can use a platform walker with the brace on. Sutures removed today with the patients permission. The patient will come back for a follow up in 6 weeks. At that time, we will take 3 views of the right wrist. PT if needed then.     As this patient has demonstrated risk factors for osteoporosis, such as age greater than [de-identified] years and evidence of a fracture, I have referred the patient back to the primary care physician for evaluation for osteoporosis, including consideration for DEXA scanning, if this is felt to be clinically indicated. The patient is advised to contact the primary care physician to follow-up for further evaluation. Procedures    Lanette Baig Titan Wrist Long Forearm Brace     Patient was prescribed a Lanette Baig Titan Wrist and Forearm Brace. The right wrist will require stabilization / immobilization from this semi-rigid / rigid orthosis to improve their function. The orthosis will assist in protecting the affected area, provide functional support and facilitate healing. The patient was educated and fit by a healthcare professional with expert knowledge and specialization in brace application while under the direct supervision of the treating physician. Verbal and written instructions for the use of and application of this item were provided. They were instructed to contact the office immediately should the brace result in increased pain, decreased sensation, increased swelling or worsening of the condition.          Jasson Costello, APRN - CNP

## 2022-04-11 ENCOUNTER — TELEPHONE (OUTPATIENT)
Dept: ORTHOPEDIC SURGERY | Age: 87
End: 2022-04-11

## 2022-04-11 NOTE — TELEPHONE ENCOUNTER
General Question     Subject: RT WRIST  Patient and /or Facility Request: Ramona Rutledge  Contact Number: [phone]    ZAIN  PHYSICAL THERAPIST @ [facility] CALLING REGARDING PATIENT RT WRIST. PATIENT STILL WEARING A SPLINT. ZAIN WOULD LIKE TO KNOW IF PATIENT NEEDS TO BE DOING EXERCISE. PLEASE CALL ZAIN AT THE ABOVE NUMBER.

## 2022-04-11 NOTE — TELEPHONE ENCOUNTER
Called and spoke with Zay Sylvia. Per last OV note on 3/9, work on gentle ROM,NWB right hand.  Remove brace in 4 weeks (which was 4/6/2022) for sleep, shower, and rest.

## 2022-04-20 ENCOUNTER — OFFICE VISIT (OUTPATIENT)
Dept: ORTHOPEDIC SURGERY | Age: 87
End: 2022-04-20

## 2022-04-20 VITALS — HEIGHT: 65 IN | WEIGHT: 95 LBS | BODY MASS INDEX: 15.83 KG/M2

## 2022-04-20 DIAGNOSIS — S52.371B: Primary | ICD-10-CM

## 2022-04-20 PROCEDURE — 99024 POSTOP FOLLOW-UP VISIT: CPT | Performed by: NURSE PRACTITIONER

## 2022-04-20 PROCEDURE — APPNB15 APP NON BILLABLE TIME 0-15 MINS: Performed by: NURSE PRACTITIONER

## 2022-04-22 NOTE — PROGRESS NOTES
DIAGNOSIS:  Right distal radius shaft Galeazzi comminuted periprosthetic fracture dislocation grade 2 open fracture, status post ORIF. DATE OF SURGERY:  2/27/2022. HISTORY OF PRESENT ILLNESS:  Ms. Ramona Jimenez 80 y.o.  female right handed returns today  for 8 weeks postoperative visit. The patient denies any significant pain in the right wrist.  Rates pain a 0/10 VAS and states she feels almost back to normal.No numbness or tingling sensation. No fever or Chills. She  is in a brace. She is in a rehab center. PHYSICAL EXAMINATION:  The incision is completely healed as well as the open fracture site. No signs of any erythema or drainage. She  has no pain with the active or passive range of motion of the right wrist, but decrease ROM. She  has intact sensation, distally, and she  is neurovascularly intact. IMAGING:  Three views right wrist taken today in the office showed anatomic alignment of right distal radius Galeazzi fracture with volar and radial fracture plate and screws in good position, no loosening. IMPRESSION:  8 weeks out from Right distal radius shaft Galeazzi comminuted periprosthetic fracture dislocation grade 2 open fracture    PLAN:  I have told the patient to work on ROM and strengthening exercises with PT,WBAT right hand. She can discontinue the forearm brace. No heavy impact activities. The patient will come back for a follow up in 6 weeks. At that time, we will take 3 views of the right wrist.     As this patient has demonstrated risk factors for osteoporosis, such as age greater than [de-identified] years and evidence of a fracture, I have referred the patient back to the primary care physician for evaluation for osteoporosis, including consideration for DEXA scanning, if this is felt to be clinically indicated. The patient is advised to contact the primary care physician to follow-up for further evaluation.          Madison Oliva, APRN - CNP

## 2022-06-08 ENCOUNTER — OFFICE VISIT (OUTPATIENT)
Dept: ORTHOPEDIC SURGERY | Age: 87
End: 2022-06-08
Payer: MEDICARE

## 2022-06-08 VITALS — WEIGHT: 95 LBS | HEIGHT: 65 IN | BODY MASS INDEX: 15.83 KG/M2

## 2022-06-08 DIAGNOSIS — S52.371B: Primary | ICD-10-CM

## 2022-06-08 PROCEDURE — 1036F TOBACCO NON-USER: CPT | Performed by: ORTHOPAEDIC SURGERY

## 2022-06-08 PROCEDURE — G8427 DOCREV CUR MEDS BY ELIG CLIN: HCPCS | Performed by: ORTHOPAEDIC SURGERY

## 2022-06-08 PROCEDURE — 1090F PRES/ABSN URINE INCON ASSESS: CPT | Performed by: ORTHOPAEDIC SURGERY

## 2022-06-08 PROCEDURE — G8419 CALC BMI OUT NRM PARAM NOF/U: HCPCS | Performed by: ORTHOPAEDIC SURGERY

## 2022-06-08 PROCEDURE — 1123F ACP DISCUSS/DSCN MKR DOCD: CPT | Performed by: ORTHOPAEDIC SURGERY

## 2022-06-08 PROCEDURE — 99213 OFFICE O/P EST LOW 20 MIN: CPT | Performed by: ORTHOPAEDIC SURGERY

## 2022-06-08 NOTE — PROGRESS NOTES
DIAGNOSIS:  Right distal radius shaft Galeazzi comminuted periprosthetic fracture dislocation grade 2 open fracture, status post ORIF. DATE OF SURGERY:  2/27/2022. HISTORY OF PRESENT ILLNESS:  Salma Machado 80 y.o.  female right handed returns today  for 3 months postoperative visit. The patient denies any significant pain in the right wrist.  Rates pain a 0/10 VAS and states she feels back to normal.she states that she has had no pain since the surgery and is doing very well. No numbness or tingling sensation. No fever or Chills. She is in a rehab center. Past Medical History:   Diagnosis Date    Hypertension        Past Surgical History:   Procedure Laterality Date    FOREARM SURGERY Right 2/27/2022    RADIUS OPEN REDUCTION INTERNAL FIXATION performed by Daisy Hong MD at [facility] History     Socioeconomic History    Marital status:      Spouse name: Not on file    Number of children: Not on file    Years of education: Not on file    Highest education level: Not on file   Occupational History    Not on file   Tobacco Use    Smoking status: Never Smoker    Smokeless tobacco: Never Used   Vaping Use    Vaping Use: Never used   Substance and Sexual Activity    Alcohol use: Never    Drug use: Never    Sexual activity: Not on file   Other Topics Concern    Not on file   Social History Narrative    Not on file     Social Determinants of Health     Financial Resource Strain:     Difficulty of Paying Living Expenses: Not on file   Food Insecurity:     Worried About Running Out of Food in the Last Year: Not on file    Madhavi of Food in the Last Year: Not on file   Transportation Needs:     Lack of Transportation (Medical): Not on file    Lack of Transportation (Non-Medical):  Not on file   Physical Activity:     Days of Exercise per Week: Not on file    Minutes of Exercise per Session: Not on file   Stress:     Feeling of Stress : Not on file   Social Connections:     Frequency of Communication with Friends and Family: Not on file    Frequency of Social Gatherings with Friends and Family: Not on file    Attends Buddhism Services: Not on file    Active Member of [employer] or Organizations: Not on file    Attends Club or Organization Meetings: Not on file    Marital Status: Not on file   Intimate Partner Violence:     Fear of Current or Ex-Partner: Not on file    Emotionally Abused: Not on file    Physically Abused: Not on file    Sexually Abused: Not on file   Housing Stability:     Unable to Pay for Housing in the Last Year: Not on file    Number of [place] in the Last Year: Not on file    Unstable Housing in the Last Year: Not on file       Family History   Problem Relation Age of Onset    Cancer Daughter        Current Outpatient Medications on File Prior to Visit   Medication Sig Dispense Refill    docusate sodium (COLACE) 100 MG capsule Take 1 capsule by mouth 2 times daily as needed for Constipation 30 capsule 0    diclofenac sodium (VOLTAREN) 1 % GEL Apply to knees bid prn pain 350 g 0    brimonidine (ALPHAGAN) 0.2 % ophthalmic solution 1 drop in the morning and at bedtime      metoprolol succinate (TOPROL XL) 50 MG extended release tablet Take 50 mg by mouth daily      triamterene-hydroCHLOROthiazide (DYAZIDE) 37.5-25 MG per capsule Take 1 capsule by mouth every morning      ferrous sulfate (IRON 325) 325 (65 Fe) MG tablet Take 325 mg by mouth daily (with breakfast)      Calcium Carb-Cholecalciferol (CALCIUM 1000 + D) 1000-800 MG-UNIT TABS Take 1 tablet by mouth daily      vitamin D (CHOLECALCIFEROL) 25 MCG (1000 UT) TABS tablet Take 2,000 Units by mouth every evening      vitamin C (ASCORBIC ACID) 500 MG tablet Take 500 mg by mouth daily      vitamin E 400 UNIT capsule Take 400 Units by mouth daily      B Complex Vitamins (B-COMPLEX/B-12) TABS Take 1 tablet by mouth every morning (before breakfast)       No current facility-administered medications on file prior to visit. Pertinent items are noted in HPI  Review of systems reviewed from Patient History Form and available in the patient's chart under the Media tab. No change noted. PHYSICAL EXAMINATION:  Ms. Dima Handy is a very pleasant 80 y.o.  female who presents today in no acute distress, awake, alert, and oriented. She is well dressed, nourished and  groomed. Patient with normal affect. Height is  5' 5\" (1.651 m), weight is 95 lb (43.1 kg), Body mass index is 15.81 kg/m². Resting respiratory rate is 16. She ambulates with assistance of walker, slow gait. The incision is completely healed as well as the open fracture site. No signs of any erythema or drainage. She  has no pain with the active or passive range of motion of the right wrist, but decrease ROM. She  has intact sensation, distally, and she  is neurovascularly intact. Good strength, and no instability both upper and lower extremities. IMAGING:  Three views right wrist taken today in the office showed anatomic alignment of right distal radius Galeazzi fracture with volar and radial fracture plate and screws in good position, no loosening. IMPRESSION: 3-month out from Right distal radius shaft Galeazzi comminuted periprosthetic fracture dislocation grade 2 open fracture    PLAN: She can go back to normal activity with no restrictions. She will be seen PRN. I told the patient that it is not unusual to have some achy pain and swelling for up to a year after a fracture. As this patient has demonstrated risk factors for osteoporosis, such as age greater than [de-identified] years and evidence of a fracture, I have referred the patient back to the primary care physician for evaluation for osteoporosis, including consideration for DEXA scanning, if this is felt to be clinically indicated.   The patient is advised to contact the primary care physician to follow-up for further evaluation.       Silva Herrera MD

## 2024-09-23 ENCOUNTER — HOSPITAL ENCOUNTER (INPATIENT)
Age: 89
LOS: 2 days | Discharge: NURSING, REHAB OR LONG-TERM CARE | DRG: 689 | End: 2024-09-26
Attending: EMERGENCY MEDICINE | Admitting: INTERNAL MEDICINE
Payer: MEDICARE

## 2024-09-23 ENCOUNTER — APPOINTMENT (OUTPATIENT)
Dept: GENERAL RADIOLOGY | Age: 89
DRG: 689 | End: 2024-09-23
Payer: MEDICARE

## 2024-09-23 DIAGNOSIS — R29.6 MULTIPLE FALLS: ICD-10-CM

## 2024-09-23 DIAGNOSIS — S09.90XA INJURY OF HEAD, INITIAL ENCOUNTER: ICD-10-CM

## 2024-09-23 DIAGNOSIS — I16.0 HYPERTENSIVE URGENCY: Primary | ICD-10-CM

## 2024-09-23 DIAGNOSIS — R41.0 CONFUSION: ICD-10-CM

## 2024-09-23 DIAGNOSIS — N30.00 ACUTE CYSTITIS WITHOUT HEMATURIA: ICD-10-CM

## 2024-09-23 LAB
BASOPHILS # BLD: 0 K/UL (ref 0–0.2)
BASOPHILS NFR BLD: 0.5 %
DEPRECATED RDW RBC AUTO: 12.5 % (ref 12.4–15.4)
EOSINOPHIL # BLD: 0.1 K/UL (ref 0–0.6)
EOSINOPHIL NFR BLD: 1.3 %
HCT VFR BLD AUTO: 39 % (ref 36–48)
HGB BLD-MCNC: 13.1 G/DL (ref 12–16)
LYMPHOCYTES # BLD: 1.2 K/UL (ref 1–5.1)
LYMPHOCYTES NFR BLD: 17.1 %
MCH RBC QN AUTO: 30.1 PG (ref 26–34)
MCHC RBC AUTO-ENTMCNC: 33.5 G/DL (ref 31–36)
MCV RBC AUTO: 89.7 FL (ref 80–100)
MONOCYTES # BLD: 0.5 K/UL (ref 0–1.3)
MONOCYTES NFR BLD: 7.2 %
NEUTROPHILS # BLD: 5 K/UL (ref 1.7–7.7)
NEUTROPHILS NFR BLD: 73.9 %
PLATELET # BLD AUTO: 222 K/UL (ref 135–450)
PMV BLD AUTO: 7.6 FL (ref 5–10.5)
RBC # BLD AUTO: 4.35 M/UL (ref 4–5.2)
WBC # BLD AUTO: 6.7 K/UL (ref 4–11)

## 2024-09-23 PROCEDURE — 71045 X-RAY EXAM CHEST 1 VIEW: CPT

## 2024-09-23 PROCEDURE — 87077 CULTURE AEROBIC IDENTIFY: CPT

## 2024-09-23 PROCEDURE — 73070 X-RAY EXAM OF ELBOW: CPT

## 2024-09-23 PROCEDURE — 2580000003 HC RX 258: Performed by: EMERGENCY MEDICINE

## 2024-09-23 PROCEDURE — 84484 ASSAY OF TROPONIN QUANT: CPT

## 2024-09-23 PROCEDURE — 87186 SC STD MICRODIL/AGAR DIL: CPT

## 2024-09-23 PROCEDURE — 81003 URINALYSIS AUTO W/O SCOPE: CPT

## 2024-09-23 PROCEDURE — 99285 EMERGENCY DEPT VISIT HI MDM: CPT

## 2024-09-23 PROCEDURE — 93005 ELECTROCARDIOGRAM TRACING: CPT | Performed by: EMERGENCY MEDICINE

## 2024-09-23 PROCEDURE — 87086 URINE CULTURE/COLONY COUNT: CPT

## 2024-09-23 PROCEDURE — 85025 COMPLETE CBC W/AUTO DIFF WBC: CPT

## 2024-09-23 RX ORDER — 0.9 % SODIUM CHLORIDE 0.9 %
1000 INTRAVENOUS SOLUTION INTRAVENOUS ONCE
Status: COMPLETED | OUTPATIENT
Start: 2024-09-23 | End: 2024-09-24

## 2024-09-23 RX ADMIN — SODIUM CHLORIDE 1000 ML: 9 INJECTION, SOLUTION INTRAVENOUS at 23:36

## 2024-09-23 ASSESSMENT — PAIN SCALES - GENERAL: PAINLEVEL_OUTOF10: 0

## 2024-09-24 PROBLEM — G93.41 ACUTE METABOLIC ENCEPHALOPATHY: Status: ACTIVE | Noted: 2024-09-24

## 2024-09-24 PROBLEM — N39.0 UTI (URINARY TRACT INFECTION): Status: ACTIVE | Noted: 2024-09-24

## 2024-09-24 PROBLEM — R79.89 ELEVATED TROPONIN: Status: ACTIVE | Noted: 2024-09-24

## 2024-09-24 LAB
ALBUMIN SERPL-MCNC: 3.1 G/DL (ref 3.4–5)
ALBUMIN/GLOB SERPL: 1.1 {RATIO} (ref 1.1–2.2)
ALP SERPL-CCNC: 57 U/L (ref 40–129)
ALT SERPL-CCNC: ABNORMAL U/L (ref 10–40)
ANION GAP SERPL CALCULATED.3IONS-SCNC: 9 MMOL/L (ref 3–16)
AST SERPL-CCNC: 63 U/L (ref 15–37)
BACTERIA URNS QL MICRO: ABNORMAL /HPF
BILIRUB SERPL-MCNC: 0.6 MG/DL (ref 0–1)
BILIRUB UR QL STRIP.AUTO: NEGATIVE
BUN SERPL-MCNC: 18 MG/DL (ref 7–20)
CALCIUM SERPL-MCNC: 8.6 MG/DL (ref 8.3–10.6)
CHLORIDE SERPL-SCNC: 98 MMOL/L (ref 99–110)
CHOLEST SERPL-MCNC: 189 MG/DL (ref 0–199)
CLARITY UR: CLEAR
CO2 SERPL-SCNC: 25 MMOL/L (ref 21–32)
COLOR UR: YELLOW
CREAT SERPL-MCNC: 0.6 MG/DL (ref 0.6–1.2)
EKG ATRIAL RATE: 75 BPM
EKG DIAGNOSIS: NORMAL
EKG P AXIS: 93 DEGREES
EKG P-R INTERVAL: 172 MS
EKG Q-T INTERVAL: 410 MS
EKG QRS DURATION: 70 MS
EKG QTC CALCULATION (BAZETT): 457 MS
EKG R AXIS: -38 DEGREES
EKG T AXIS: 72 DEGREES
EKG VENTRICULAR RATE: 75 BPM
EPI CELLS #/AREA URNS HPF: ABNORMAL /HPF (ref 0–5)
GFR SERPLBLD CREATININE-BSD FMLA CKD-EPI: 83 ML/MIN/{1.73_M2}
GLUCOSE SERPL-MCNC: 114 MG/DL (ref 70–99)
GLUCOSE UR STRIP.AUTO-MCNC: NEGATIVE MG/DL
HDLC SERPL-MCNC: 85 MG/DL (ref 40–60)
HGB UR QL STRIP.AUTO: ABNORMAL
KETONES UR STRIP.AUTO-MCNC: ABNORMAL MG/DL
LACTATE BLDV-SCNC: 1 MMOL/L (ref 0.4–2)
LDL CHOLESTEROL: 93 MG/DL
LEUKOCYTE ESTERASE UR QL STRIP.AUTO: ABNORMAL
NITRITE UR QL STRIP.AUTO: POSITIVE
NT-PROBNP SERPL-MCNC: 1347 PG/ML (ref 0–449)
PH UR STRIP.AUTO: 6.5 [PH] (ref 5–8)
POTASSIUM SERPL-SCNC: ABNORMAL MMOL/L (ref 3.5–5.1)
PROT SERPL-MCNC: 5.9 G/DL (ref 6.4–8.2)
PROT UR STRIP.AUTO-MCNC: 100 MG/DL
RBC #/AREA URNS HPF: ABNORMAL /HPF (ref 0–4)
SODIUM SERPL-SCNC: 132 MMOL/L (ref 136–145)
SP GR UR STRIP.AUTO: 1.02 (ref 1–1.03)
TRIGL SERPL-MCNC: 56 MG/DL (ref 0–150)
TROPONIN, HIGH SENSITIVITY: 48 NG/L (ref 0–14)
TROPONIN, HIGH SENSITIVITY: 53 NG/L (ref 0–14)
TROPONIN, HIGH SENSITIVITY: 54 NG/L (ref 0–14)
TROPONIN, HIGH SENSITIVITY: 59 NG/L (ref 0–14)
TSH SERPL DL<=0.005 MIU/L-ACNC: 4.9 UIU/ML (ref 0.27–4.2)
UA COMPLETE W REFLEX CULTURE PNL UR: YES
UA DIPSTICK W REFLEX MICRO PNL UR: YES
URN SPEC COLLECT METH UR: ABNORMAL
UROBILINOGEN UR STRIP-ACNC: 1 E.U./DL
VIT B12 SERPL-MCNC: 850 PG/ML (ref 211–911)
VLDLC SERPL CALC-MCNC: 11 MG/DL
WBC #/AREA URNS HPF: ABNORMAL /HPF (ref 0–5)

## 2024-09-24 PROCEDURE — 84443 ASSAY THYROID STIM HORMONE: CPT

## 2024-09-24 PROCEDURE — 36415 COLL VENOUS BLD VENIPUNCTURE: CPT

## 2024-09-24 PROCEDURE — 82607 VITAMIN B-12: CPT

## 2024-09-24 PROCEDURE — 97161 PT EVAL LOW COMPLEX 20 MIN: CPT

## 2024-09-24 PROCEDURE — 1200000000 HC SEMI PRIVATE

## 2024-09-24 PROCEDURE — 83605 ASSAY OF LACTIC ACID: CPT

## 2024-09-24 PROCEDURE — 94760 N-INVAS EAR/PLS OXIMETRY 1: CPT

## 2024-09-24 PROCEDURE — 83880 ASSAY OF NATRIURETIC PEPTIDE: CPT

## 2024-09-24 PROCEDURE — 2580000003 HC RX 258: Performed by: INTERNAL MEDICINE

## 2024-09-24 PROCEDURE — 92610 EVALUATE SWALLOWING FUNCTION: CPT

## 2024-09-24 PROCEDURE — 93010 ELECTROCARDIOGRAM REPORT: CPT | Performed by: INTERNAL MEDICINE

## 2024-09-24 PROCEDURE — 97166 OT EVAL MOD COMPLEX 45 MIN: CPT

## 2024-09-24 PROCEDURE — 87040 BLOOD CULTURE FOR BACTERIA: CPT

## 2024-09-24 PROCEDURE — 80053 COMPREHEN METABOLIC PANEL: CPT

## 2024-09-24 PROCEDURE — 6360000002 HC RX W HCPCS: Performed by: EMERGENCY MEDICINE

## 2024-09-24 PROCEDURE — 84484 ASSAY OF TROPONIN QUANT: CPT

## 2024-09-24 PROCEDURE — 80061 LIPID PANEL: CPT

## 2024-09-24 PROCEDURE — 6360000002 HC RX W HCPCS: Performed by: INTERNAL MEDICINE

## 2024-09-24 PROCEDURE — 97530 THERAPEUTIC ACTIVITIES: CPT

## 2024-09-24 PROCEDURE — 6370000000 HC RX 637 (ALT 250 FOR IP): Performed by: INTERNAL MEDICINE

## 2024-09-24 RX ORDER — ONDANSETRON 2 MG/ML
4 INJECTION INTRAMUSCULAR; INTRAVENOUS EVERY 6 HOURS PRN
Status: DISCONTINUED | OUTPATIENT
Start: 2024-09-24 | End: 2024-09-26 | Stop reason: HOSPADM

## 2024-09-24 RX ORDER — POLYETHYLENE GLYCOL 3350 17 G/17G
17 POWDER, FOR SOLUTION ORAL DAILY PRN
Status: DISCONTINUED | OUTPATIENT
Start: 2024-09-24 | End: 2024-09-26 | Stop reason: HOSPADM

## 2024-09-24 RX ORDER — SODIUM CHLORIDE 0.9 % (FLUSH) 0.9 %
5-40 SYRINGE (ML) INJECTION EVERY 12 HOURS SCHEDULED
Status: DISCONTINUED | OUTPATIENT
Start: 2024-09-24 | End: 2024-09-26 | Stop reason: HOSPADM

## 2024-09-24 RX ORDER — AMLODIPINE BESYLATE 5 MG/1
5 TABLET ORAL DAILY
Status: DISCONTINUED | OUTPATIENT
Start: 2024-09-24 | End: 2024-09-26

## 2024-09-24 RX ORDER — ASPIRIN 81 MG/1
81 TABLET ORAL DAILY
Status: DISCONTINUED | OUTPATIENT
Start: 2024-09-24 | End: 2024-09-26 | Stop reason: HOSPADM

## 2024-09-24 RX ORDER — CARBOXYMETHYLCELLULOSE SODIUM AND GLYCERIN 10; 9 MG/ML; MG/ML
1 SOLUTION/ DROPS OPHTHALMIC NIGHTLY
COMMUNITY

## 2024-09-24 RX ORDER — ONDANSETRON 4 MG/1
4 TABLET, ORALLY DISINTEGRATING ORAL EVERY 8 HOURS PRN
Status: DISCONTINUED | OUTPATIENT
Start: 2024-09-24 | End: 2024-09-26 | Stop reason: HOSPADM

## 2024-09-24 RX ORDER — LABETALOL HYDROCHLORIDE 5 MG/ML
10 INJECTION, SOLUTION INTRAVENOUS ONCE
Status: COMPLETED | OUTPATIENT
Start: 2024-09-24 | End: 2024-09-24

## 2024-09-24 RX ORDER — ACETAMINOPHEN 325 MG/1
650 TABLET ORAL EVERY 6 HOURS PRN
Status: DISCONTINUED | OUTPATIENT
Start: 2024-09-24 | End: 2024-09-26 | Stop reason: HOSPADM

## 2024-09-24 RX ORDER — VITAMIN B COMPLEX
2000 TABLET ORAL EVERY EVENING
Status: DISCONTINUED | OUTPATIENT
Start: 2024-09-24 | End: 2024-09-26 | Stop reason: HOSPADM

## 2024-09-24 RX ORDER — HYDRALAZINE HYDROCHLORIDE 20 MG/ML
10 INJECTION INTRAMUSCULAR; INTRAVENOUS EVERY 4 HOURS PRN
Status: DISCONTINUED | OUTPATIENT
Start: 2024-09-24 | End: 2024-09-26 | Stop reason: HOSPADM

## 2024-09-24 RX ORDER — SODIUM CHLORIDE 0.9 % (FLUSH) 0.9 %
5-40 SYRINGE (ML) INJECTION PRN
Status: DISCONTINUED | OUTPATIENT
Start: 2024-09-24 | End: 2024-09-26 | Stop reason: HOSPADM

## 2024-09-24 RX ORDER — ENOXAPARIN SODIUM 100 MG/ML
30 INJECTION SUBCUTANEOUS DAILY
Status: DISCONTINUED | OUTPATIENT
Start: 2024-09-24 | End: 2024-09-26 | Stop reason: HOSPADM

## 2024-09-24 RX ORDER — ACETAMINOPHEN 650 MG/1
650 SUPPOSITORY RECTAL EVERY 6 HOURS PRN
Status: DISCONTINUED | OUTPATIENT
Start: 2024-09-24 | End: 2024-09-26 | Stop reason: HOSPADM

## 2024-09-24 RX ORDER — METOPROLOL SUCCINATE 50 MG/1
50 TABLET, EXTENDED RELEASE ORAL DAILY
Status: DISCONTINUED | OUTPATIENT
Start: 2024-09-24 | End: 2024-09-26 | Stop reason: HOSPADM

## 2024-09-24 RX ORDER — ERYTHROMYCIN 5 MG/G
1 OINTMENT OPHTHALMIC NIGHTLY
COMMUNITY

## 2024-09-24 RX ORDER — BRIMONIDINE TARTRATE AND TIMOLOL MALEATE 2; 5 MG/ML; MG/ML
1 SOLUTION OPHTHALMIC EVERY 12 HOURS
COMMUNITY

## 2024-09-24 RX ORDER — SODIUM CHLORIDE 9 MG/ML
INJECTION, SOLUTION INTRAVENOUS PRN
Status: DISCONTINUED | OUTPATIENT
Start: 2024-09-24 | End: 2024-09-26 | Stop reason: HOSPADM

## 2024-09-24 RX ADMIN — SODIUM CHLORIDE, PRESERVATIVE FREE 10 ML: 5 INJECTION INTRAVENOUS at 18:03

## 2024-09-24 RX ADMIN — METOPROLOL SUCCINATE 50 MG: 50 TABLET, EXTENDED RELEASE ORAL at 10:00

## 2024-09-24 RX ADMIN — HYDRALAZINE HYDROCHLORIDE 10 MG: 20 INJECTION INTRAMUSCULAR; INTRAVENOUS at 23:24

## 2024-09-24 RX ADMIN — SODIUM CHLORIDE, PRESERVATIVE FREE 10 ML: 5 INJECTION INTRAVENOUS at 20:29

## 2024-09-24 RX ADMIN — HYDRALAZINE HYDROCHLORIDE 10 MG: 20 INJECTION INTRAMUSCULAR; INTRAVENOUS at 04:44

## 2024-09-24 RX ADMIN — CEFTRIAXONE SODIUM 2000 MG: 2 INJECTION, POWDER, FOR SOLUTION INTRAMUSCULAR; INTRAVENOUS at 04:53

## 2024-09-24 RX ADMIN — ENOXAPARIN SODIUM 30 MG: 100 INJECTION SUBCUTANEOUS at 10:01

## 2024-09-24 RX ADMIN — LABETALOL HYDROCHLORIDE 10 MG: 5 INJECTION INTRAVENOUS at 02:28

## 2024-09-24 NOTE — CARE COORDINATION
RN CM left HIPAA complaint message for Flora Director at [facility] to discuss patient returning. RN CM will continue to follow.   Electronically signed by Gabirella Toussaint RN on 9/24/2024 at 2:06 PM  [phone]

## 2024-09-24 NOTE — ED NOTES
ED SBAR report provider to GONZALO Gray. Patient to be transported to Room 5275 via stretcher by ED tech.Patient transported with bedside cardiac monitor. IV site clean, dry, and intact. Updated patient and family on plan of care.     Daughter at bedside.

## 2024-09-24 NOTE — CONSULTS
She is disoriented.      Motor: Weakness present.   Psychiatric:         Attention and Perception: She perceives visual hallucinations.         Mood and Affect: Mood normal.         Cognition and Memory: Memory is impaired.          OBJECTIVE   BP (!) 162/92   Pulse 74   Temp 98.1 °F (36.7 °C) (Oral)   Resp 24   Ht 1.651 m (5' 5\")   Wt 45.2 kg (99 lb 10.4 oz)   SpO2 93%   BMI 16.58 kg/m²   No intake/output data recorded.  I/O this shift:  In: 120 [P.O.:120]  Out: -       Palliative Medicine Interventions:    patient/family support  Goals of Care discussions with patient/surrogate  Spiritual Interventions: none         DATA:  Current labs in the epic chart reviewed as of 9/24/2024   Review of previous notes, admits, labs, radiology and testing relevant to this consult done in this chart today 9/24/2024    Medical Decision Making:  The following items were considered in medical decision making:  Review of prior external note(s) from each unique source relevant to today's visit: Hospitalist, Case management.   Unique test results reviewed: CBC, CMP, BNP, Troponin.        Risk of Complications/Morbidity: High   Illness(es)/ Infection present that pose threat to bodily function.   There is potential for severe exacerbation of condition/side effects of treatment.  Therapy requires intensive monitoring for toxicity        The patient and/or authorized decision maker consented to a voluntary Advance Care Planning conversation.   Decisional Capacity: Limited   Individuals present for the conversation:  Documented healthcare agent   Other persons present:  None    Legal Healthcare Decision Maker(s):    Primary Decision Maker: Whit Neff - Child - [phone]    ACP documents available in EMR prior to discussion:  [x] Advance Medical Directive  [] Portable DNR  [] POLST  [] [place] MOST   [] None  [] ACP documents have been previously completed by patient or surrogate, but are not available today for review.

## 2024-09-24 NOTE — PLAN OF CARE
Problem: Discharge Planning  Goal: Discharge to home or other facility with appropriate resources  9/24/2024 1654 by Uyen Velasquez RN  Outcome: Progressing  9/24/2024 0608 by Marina Martinez RN  Outcome: Progressing  Flowsheets (Taken 9/24/2024 0424)  Discharge to home or other facility with appropriate resources: Identify barriers to discharge with patient and caregiver     Problem: Skin/Tissue Integrity  Goal: Absence of new skin breakdown  Description: 1.  Monitor for areas of redness and/or skin breakdown  2.  Assess vascular access sites hourly  3.  Every 4-6 hours minimum:  Change oxygen saturation probe site  4.  Every 4-6 hours:  If on nasal continuous positive airway pressure, respiratory therapy assess nares and determine need for appliance change or resting period.  9/24/2024 1654 by Uyen Velasquez RN  Outcome: Progressing  9/24/2024 0608 by Marina Martinez RN  Outcome: Progressing     Problem: ABCDS Injury Assessment  Goal: Absence of physical injury  9/24/2024 1654 by Uyen Velasquez RN  Outcome: Progressing  9/24/2024 0608 by Marina Martinez RN  Outcome: Progressing     Problem: Safety - Adult  Goal: Free from fall injury  9/24/2024 1654 by Uyen Velasquez RN  Outcome: Progressing  9/24/2024 0608 by Marina Martinez RN  Outcome: Progressing     Problem: Safety - Medical Restraint  Goal: Remains free of injury from restraints (Restraint for Interference with Medical Device)  Description: INTERVENTIONS:  1. Determine that other, less restrictive measures have been tried or would not be effective before applying the restraint  2. Evaluate the patient's condition at the time of restraint application  3. Inform patient/family regarding the reason for restraint  4. Q2H: Monitor safety, psychosocial status, comfort, nutrition and hydration  Outcome: Progressing     Problem: Neurosensory - Adult  Goal: Achieves stable or improved neurological status  Outcome: Progressing  Goal: Achieves maximal

## 2024-09-24 NOTE — ED TRIAGE NOTES
Patient had multiple falls today and AMS. Reports hitting head. Denies pain. From [facility]. VSS en route.

## 2024-09-24 NOTE — H&P
\"BLOODCULT2\"  Organism: No results found for: \"ORG\"    Imaging/Diagnostics Last 24 Hours   XR ELBOW RIGHT (2 VIEWS)    Result Date: 9/24/2024  EXAMINATION: 2 XRAY VIEWS OF THE RIGHT ELBOW 9/23/2024 10:47 pm COMPARISON: None HISTORY: ORDERING SYSTEM PROVIDED HISTORY: 2 falls in 2 days.  Hit her elbow yesterday with a skin tear. TECHNOLOGIST PROVIDED HISTORY: Reason for exam:->2 falls in 2 days.  Hit her elbow yesterday with a skin tear. Reason for Exam: fall FINDINGS: Bones are osteopenic.  No acute fracture or dislocation is seen.  There is a small joint effusion.  There are postop changes along the distal radius.     Diffuse osteopenia limiting the exam.  Within the limitations of the exam, no obvious acute fracture is seen Small joint effusion which can be indicative of an occult bony abnormality. Recommend orthopedic follow-up.     XR CHEST PORTABLE    Result Date: 9/24/2024  EXAMINATION: ONE XRAY VIEW OF THE CHEST 9/23/2024 10:47 pm COMPARISON: 02/26/2022 HISTORY: ORDERING SYSTEM PROVIDED HISTORY: Frequent falls TECHNOLOGIST PROVIDED HISTORY: Reason for exam:->Frequent falls Reason for Exam: fall FINDINGS: The heart is borderline enlarged.  The pulmonary vessels are engorged centrally.  There is some hazy right perihilar opacity extending into the right lung base which is more prominent.  There is a small right pleural effusion with a questionable tiny left pleural effusion.     Borderline cardiomegaly with mild central pulmonary congestion. Hazy right perihilar opacity extending into the lung base which could represent atelectasis and/or infiltrate and a small right pleural effusion. Tiny left pleural effusion which is unchanged         Electronically signed by Dara Pantoja MD on 9/24/2024 at 4:33 AM

## 2024-09-24 NOTE — CARE COORDINATION
RN KURT spoke with Flora Cruz from [facility] and she is aware of patient's current assist of moderate assist x 2 and stated she is able to accept patient back. [facility] for therapy.   Electronically signed by Gabriella Toussaint RN on 9/24/2024 at 3:16 PM  [phone]

## 2024-09-24 NOTE — CARE COORDINATION
09/24/24 9696   Service Assessment   Patient Orientation Person   Cognition Other (see comment)  (Confused)   History Provided By Child/Family   Primary Caregiver Self   Accompanied By/Relationship daughter Whit   Support Systems Children;Family Members   Patient's Healthcare Decision Maker is: Legal Next of Kin   PCP Verified by CM Yes  (Patient sees PCP at facility at [facility])   Last Visit to PCP Within last 3 months   Prior Functional Level Independent in ADLs/IADLs   Current Functional Level Assistance with the following:;Bathing;Dressing;Housework;Cooking;Toileting   Can patient return to prior living arrangement Unknown at present   Ability to make needs known: Unable   Family able to assist with home care needs: Other (comment)  (Patient from [facility])   Would you like for me to discuss the discharge plan with any other family members/significant others, and if so, who? Yes  (daughter Whit)   Financial Resources Medicare   Community Resources Assisted Living   CM/SW Referral Other (see comment)  (discharge planning)   Discharge Planning   Type of Residence Assisted living   Living Arrangements Alone   Current Services Prior To Admission Durable Medical Equipment   Current DME Prior to Arrival Walker;Wheelchair   Potential Assistance Needed Skilled Nursing Facility   DME Ordered? No   Potential Assistance Purchasing Medications No   Type of Home Care Services None   Patient expects to be discharged to: Assisted living   One/Two Story Residence One story   History of falls? 1   Services At/After Discharge   Transition of Care Consult (CM Consult) N/A   Services At/After Discharge Assisted living    Resource Information Provided? No   Mode of Transport at Discharge Other (see comment)  (car)   Confirm Follow Up Transport Family   Condition of Participation: Discharge Planning   The Patient and/or Patient Representative was provided with a Choice of Provider? Patient   The

## 2024-09-24 NOTE — ED PROVIDER NOTES
[facility] [address] [facility] CARE  EMERGENCY DEPARTMENT ENCOUNTER      Pt Name: Donna Peoples  MRN: [id number]  Birthdate 10/17/1930  Date of evaluation: 9/23/2024  Provider: PLACIDO CORTES DO    CHIEF COMPLAINT  Chief Complaint   Patient presents with    Fall     Patient had multiple falls today and AMS. Reports hitting head. Denies pain. From [facility]. VSS en route.          This patient is at risk for a communicable infection.  Therefore, personal protection equipment consisting of a mask was worn for the exam.    HPI  Donna Peoples is a 89 y.o. female who presents with multiple falls today with altered mental status.  She hit her head.  She denies any pain at this time.  She came from the [facility] ([place]  She denies any fevers or chills.  She denies any nausea vomiting.  Patient fell from standing.  She was using a walker at that time.  She could not get up because of weakness.  She states she did hit her head.  She states she fell backwards and hit the back of her head she denies being on blood thinners.  Nothing makes it better or worse.  She describes it as moderate    REVIEW OF SYSTEMS  All systems negative except as noted in the HPI.  Reviewed Nurses' notes and concur.    No LMP recorded.    PAST MEDICAL HISTORY  Past Medical History:   Diagnosis Date    Hypertension        FAMILY HISTORY  Family History   Problem Relation Age of Onset    Cancer Daughter        SOCIAL HISTORY   reports that she has never smoked. She has never used smokeless tobacco. She reports that she does not drink alcohol and does not use drugs.    SURGICAL HISTORY  Past Surgical History:   Procedure Laterality Date    FOREARM SURGERY Right 2/27/2022    RADIUS OPEN REDUCTION INTERNAL FIXATION performed by Zuleima Cooper MD at [facility] OR    FRACTURE SURGERY         CURRENT MEDICATIONS  Current Outpatient Rx   Medication Sig Dispense Refill    amLODIPine (NORVASC) 5 MG tablet Take 1 tablet by mouth daily 30 tablet 3    aspirin 81 MG EC

## 2024-09-25 LAB
BACTERIA UR CULT: ABNORMAL
BASOPHILS # BLD: 0 K/UL (ref 0–0.2)
BASOPHILS NFR BLD: 0.3 %
DEPRECATED RDW RBC AUTO: 13 % (ref 12.4–15.4)
EOSINOPHIL # BLD: 0 K/UL (ref 0–0.6)
EOSINOPHIL NFR BLD: 0.2 %
HCT VFR BLD AUTO: 41.2 % (ref 36–48)
HGB BLD-MCNC: 13.9 G/DL (ref 12–16)
LYMPHOCYTES # BLD: 1.2 K/UL (ref 1–5.1)
LYMPHOCYTES NFR BLD: 13.6 %
MCH RBC QN AUTO: 30 PG (ref 26–34)
MCHC RBC AUTO-ENTMCNC: 33.7 G/DL (ref 31–36)
MCV RBC AUTO: 88.9 FL (ref 80–100)
MONOCYTES # BLD: 0.7 K/UL (ref 0–1.3)
MONOCYTES NFR BLD: 8 %
NEUTROPHILS # BLD: 7 K/UL (ref 1.7–7.7)
NEUTROPHILS NFR BLD: 77.9 %
ORGANISM: ABNORMAL
PLATELET # BLD AUTO: 240 K/UL (ref 135–450)
PMV BLD AUTO: 7.6 FL (ref 5–10.5)
RBC # BLD AUTO: 4.63 M/UL (ref 4–5.2)
WBC # BLD AUTO: 9 K/UL (ref 4–11)

## 2024-09-25 PROCEDURE — 6360000002 HC RX W HCPCS: Performed by: INTERNAL MEDICINE

## 2024-09-25 PROCEDURE — 2580000003 HC RX 258: Performed by: INTERNAL MEDICINE

## 2024-09-25 PROCEDURE — 6370000000 HC RX 637 (ALT 250 FOR IP): Performed by: INTERNAL MEDICINE

## 2024-09-25 PROCEDURE — 97535 SELF CARE MNGMENT TRAINING: CPT

## 2024-09-25 PROCEDURE — 92526 ORAL FUNCTION THERAPY: CPT

## 2024-09-25 PROCEDURE — 85025 COMPLETE CBC W/AUTO DIFF WBC: CPT

## 2024-09-25 PROCEDURE — 97110 THERAPEUTIC EXERCISES: CPT

## 2024-09-25 PROCEDURE — 1200000000 HC SEMI PRIVATE

## 2024-09-25 PROCEDURE — 97530 THERAPEUTIC ACTIVITIES: CPT

## 2024-09-25 PROCEDURE — 94760 N-INVAS EAR/PLS OXIMETRY 1: CPT

## 2024-09-25 PROCEDURE — 36415 COLL VENOUS BLD VENIPUNCTURE: CPT

## 2024-09-25 RX ORDER — AMLODIPINE BESYLATE 5 MG/1
5 TABLET ORAL DAILY
DISCHARGE
Start: 2024-09-26 | End: 2024-09-26 | Stop reason: HOSPADM

## 2024-09-25 RX ORDER — LATANOPROST 50 UG/ML
1 SOLUTION/ DROPS OPHTHALMIC NIGHTLY
Status: DISCONTINUED | OUTPATIENT
Start: 2024-09-25 | End: 2024-09-26 | Stop reason: HOSPADM

## 2024-09-25 RX ORDER — ASPIRIN 81 MG/1
81 TABLET ORAL DAILY
DISCHARGE
Start: 2024-09-26

## 2024-09-25 RX ORDER — CIPROFLOXACIN 500 MG/1
500 TABLET, FILM COATED ORAL 2 TIMES DAILY
DISCHARGE
Start: 2024-09-25 | End: 2024-10-02

## 2024-09-25 RX ORDER — BRIMONIDINE TARTRATE AND TIMOLOL MALEATE 2; 5 MG/ML; MG/ML
1 SOLUTION OPHTHALMIC EVERY 12 HOURS
Status: DISCONTINUED | OUTPATIENT
Start: 2024-09-25 | End: 2024-09-25 | Stop reason: CLARIF

## 2024-09-25 RX ORDER — ERYTHROMYCIN 5 MG/G
1 OINTMENT OPHTHALMIC NIGHTLY
Status: DISCONTINUED | OUTPATIENT
Start: 2024-09-25 | End: 2024-09-26 | Stop reason: HOSPADM

## 2024-09-25 RX ORDER — TIMOLOL MALEATE 5 MG/ML
1 SOLUTION/ DROPS OPHTHALMIC 2 TIMES DAILY
Status: DISCONTINUED | OUTPATIENT
Start: 2024-09-25 | End: 2024-09-26 | Stop reason: HOSPADM

## 2024-09-25 RX ORDER — BRIMONIDINE TARTRATE 2 MG/ML
1 SOLUTION/ DROPS OPHTHALMIC 2 TIMES DAILY
Status: DISCONTINUED | OUTPATIENT
Start: 2024-09-25 | End: 2024-09-26 | Stop reason: HOSPADM

## 2024-09-25 RX ORDER — POLYVINYL ALCOHOL 14 MG/ML
1 SOLUTION/ DROPS OPHTHALMIC PRN
Status: DISCONTINUED | OUTPATIENT
Start: 2024-09-25 | End: 2024-09-26 | Stop reason: HOSPADM

## 2024-09-25 RX ORDER — POLYVINYL ALCOHOL 14 MG/ML
1 SOLUTION/ DROPS OPHTHALMIC NIGHTLY
Status: DISCONTINUED | OUTPATIENT
Start: 2024-09-25 | End: 2024-09-26 | Stop reason: HOSPADM

## 2024-09-25 RX ADMIN — METOPROLOL SUCCINATE 50 MG: 50 TABLET, EXTENDED RELEASE ORAL at 08:59

## 2024-09-25 RX ADMIN — Medication 1 TABLET: at 08:59

## 2024-09-25 RX ADMIN — HYDRALAZINE HYDROCHLORIDE 10 MG: 20 INJECTION INTRAMUSCULAR; INTRAVENOUS at 09:06

## 2024-09-25 RX ADMIN — ENOXAPARIN SODIUM 30 MG: 100 INJECTION SUBCUTANEOUS at 09:06

## 2024-09-25 RX ADMIN — HYDRALAZINE HYDROCHLORIDE 10 MG: 20 INJECTION INTRAMUSCULAR; INTRAVENOUS at 16:08

## 2024-09-25 RX ADMIN — Medication 2000 UNITS: at 18:45

## 2024-09-25 RX ADMIN — SODIUM CHLORIDE, PRESERVATIVE FREE 10 ML: 5 INJECTION INTRAVENOUS at 09:06

## 2024-09-25 RX ADMIN — AMLODIPINE BESYLATE 5 MG: 5 TABLET ORAL at 08:59

## 2024-09-25 RX ADMIN — WATER 1000 MG: 1 INJECTION INTRAMUSCULAR; INTRAVENOUS; SUBCUTANEOUS at 09:06

## 2024-09-25 RX ADMIN — TIMOLOL MALEATE 1 DROP: 5 SOLUTION OPHTHALMIC at 15:50

## 2024-09-25 RX ADMIN — LATANOPROST 1 DROP: 50 SOLUTION OPHTHALMIC at 20:24

## 2024-09-25 RX ADMIN — TIMOLOL MALEATE 1 DROP: 5 SOLUTION OPHTHALMIC at 20:25

## 2024-09-25 RX ADMIN — ERYTHROMYCIN 1 CM: 5 OINTMENT OPHTHALMIC at 20:24

## 2024-09-25 RX ADMIN — POLYVINYL ALCOHOL 1 DROP: 1.4 SOLUTION/ DROPS OPHTHALMIC at 20:24

## 2024-09-25 RX ADMIN — BRIMONIDINE TARTRATE 1 DROP: 2 SOLUTION OPHTHALMIC at 15:50

## 2024-09-25 RX ADMIN — ASPIRIN 81 MG: 81 TABLET, COATED ORAL at 08:58

## 2024-09-25 RX ADMIN — SODIUM CHLORIDE, PRESERVATIVE FREE 10 ML: 5 INJECTION INTRAVENOUS at 20:36

## 2024-09-25 RX ADMIN — BRIMONIDINE TARTRATE 1 DROP: 2 SOLUTION OPHTHALMIC at 20:25

## 2024-09-25 ASSESSMENT — ENCOUNTER SYMPTOMS
SHORTNESS OF BREATH: 0
ABDOMINAL PAIN: 0

## 2024-09-25 NOTE — DISCHARGE INSTR - COC
Continuity of Care Form    Patient Name: Donna Alexander   :  10/17/1930  MRN:  [id number]    Admit date:  2024  Discharge date:  2024    Code Status Order: DNR-CC   Advance Directives:   Advance Care Flowsheet Documentation             Admitting Physician:  Dara Pantoja MD  PCP: John Glasgow    Discharging Nurse: Jaylin Salazar Rn  Discharging Hospital Unit/Room#: T7Y-1546/5281-01  Discharging Unit Phone Number: [phone]    Emergency Contact:   Extended Emergency Contact Information  Primary Emergency Contact: [place] [address] [facility]  Home Phone: [phone]  Mobile Phone: [phone]  Relation: Child  Preferred language: English   needed? No  Secondary Emergency Contact: VALERIA ALEXANDER  Home Phone: [phone]  Relation: Child  Preferred language: English   needed? No    Past Surgical History:  Past Surgical History:   Procedure Laterality Date    FOREARM SURGERY Right 2022    RADIUS OPEN REDUCTION INTERNAL FIXATION performed by Zuleima Cooper MD at [facility] OR    FRACTURE SURGERY         Immunization History:   Immunization History   Administered Date(s) Administered    COVID-19, PFIZER GRAY top, DO NOT Dilute, (age 12 y+), IM, 30 mcg/0.3 mL 03/15/2022    COVID-19, PFIZER PURPLE top, DILUTE for use, (age 12 y+), 30mcg/0.3mL 2021       Active Problems:  Patient Active Problem List   Diagnosis Code    Wrist fracture S62.109A    Uncontrolled hypertension I10    Wrist fracture, closed, right, initial encounter S62.101A    Open wrist fracture, right, initial encounter S62.101B    Type I or II open Galeazzi's fracture of radius S52.379B    UTI (urinary tract infection) N39.0    Elevated troponin R79.89    Acute metabolic encephalopathy G93.41       Isolation/Infection:   Isolation            No Isolation          Patient Infection Status       None to display            Nurse Assessment:  Last Vital Signs: BP (!) 146/84

## 2024-09-25 NOTE — CARE COORDINATION
Discharge order noted. RN CM spoke with Flora Director of Nursing from [facility] and patient has to be restraint free for 24 hours before going there. GONZALO HICKS will continue to follow patient. GONZALO HICKS phoned GONZALO Jenkins and [employer] Dr. Jarquin to make him aware. RN CM left HIPAA complaint message left for daughter Whit to make her aware. GONZALO HICKS will continue to follow.   Electronically signed by Gabriella Toussaint RN on 9/25/2024 at 4:23 PM  [phone]

## 2024-09-25 NOTE — CARE COORDINATION
RN KURT spoke with daughter Whit and she is aware patient has to be restraint free for 24 hours before being able to go back to [facility]. She verbalized understanding.   Electronically signed by Gabriella Toussaint RN on 9/25/2024 at 4:47 PM

## 2024-09-26 VITALS
OXYGEN SATURATION: 93 % | HEIGHT: 65 IN | HEART RATE: 61 BPM | DIASTOLIC BLOOD PRESSURE: 77 MMHG | WEIGHT: 93.03 LBS | TEMPERATURE: 97.9 F | BODY MASS INDEX: 15.5 KG/M2 | RESPIRATION RATE: 18 BRPM | SYSTOLIC BLOOD PRESSURE: 125 MMHG

## 2024-09-26 PROCEDURE — 6360000002 HC RX W HCPCS: Performed by: INTERNAL MEDICINE

## 2024-09-26 PROCEDURE — 2580000003 HC RX 258: Performed by: INTERNAL MEDICINE

## 2024-09-26 PROCEDURE — 97530 THERAPEUTIC ACTIVITIES: CPT

## 2024-09-26 PROCEDURE — 92526 ORAL FUNCTION THERAPY: CPT

## 2024-09-26 PROCEDURE — 6370000000 HC RX 637 (ALT 250 FOR IP): Performed by: INTERNAL MEDICINE

## 2024-09-26 PROCEDURE — 94760 N-INVAS EAR/PLS OXIMETRY 1: CPT

## 2024-09-26 PROCEDURE — 97110 THERAPEUTIC EXERCISES: CPT

## 2024-09-26 RX ORDER — AMLODIPINE BESYLATE 5 MG/1
5 TABLET ORAL ONCE
Status: DISCONTINUED | OUTPATIENT
Start: 2024-09-26 | End: 2024-09-26 | Stop reason: DRUGHIGH

## 2024-09-26 RX ORDER — AMLODIPINE BESYLATE 5 MG/1
5 TABLET ORAL DAILY
Status: DISCONTINUED | OUTPATIENT
Start: 2024-09-27 | End: 2024-09-26 | Stop reason: HOSPADM

## 2024-09-26 RX ORDER — AMLODIPINE BESYLATE 10 MG/1
10 TABLET ORAL DAILY
Qty: 30 TABLET | Refills: 3 | Status: SHIPPED | OUTPATIENT
Start: 2024-09-27 | End: 2024-09-26 | Stop reason: HOSPADM

## 2024-09-26 RX ORDER — AMLODIPINE BESYLATE 10 MG/1
10 TABLET ORAL DAILY
Status: DISCONTINUED | OUTPATIENT
Start: 2024-09-27 | End: 2024-09-26 | Stop reason: DRUGHIGH

## 2024-09-26 RX ORDER — AMLODIPINE BESYLATE 5 MG/1
5 TABLET ORAL DAILY
Qty: 30 TABLET | Refills: 3 | Status: SHIPPED | OUTPATIENT
Start: 2024-09-27

## 2024-09-26 RX ADMIN — Medication 1 TABLET: at 08:35

## 2024-09-26 RX ADMIN — AMLODIPINE BESYLATE 5 MG: 5 TABLET ORAL at 08:35

## 2024-09-26 RX ADMIN — TIMOLOL MALEATE 1 DROP: 5 SOLUTION OPHTHALMIC at 08:47

## 2024-09-26 RX ADMIN — BRIMONIDINE TARTRATE 1 DROP: 2 SOLUTION OPHTHALMIC at 08:47

## 2024-09-26 RX ADMIN — ASPIRIN 81 MG: 81 TABLET, COATED ORAL at 08:35

## 2024-09-26 RX ADMIN — METOPROLOL SUCCINATE 50 MG: 50 TABLET, EXTENDED RELEASE ORAL at 08:35

## 2024-09-26 RX ADMIN — SODIUM CHLORIDE, PRESERVATIVE FREE 10 ML: 5 INJECTION INTRAVENOUS at 08:46

## 2024-09-26 RX ADMIN — ENOXAPARIN SODIUM 30 MG: 100 INJECTION SUBCUTANEOUS at 08:38

## 2024-09-26 RX ADMIN — WATER 1000 MG: 1 INJECTION INTRAMUSCULAR; INTRAVENOUS; SUBCUTANEOUS at 08:34

## 2024-09-26 ASSESSMENT — PAIN SCALES - GENERAL: PAINLEVEL_OUTOF10: 0

## 2024-09-26 NOTE — PLAN OF CARE
Problem: Discharge Planning  Goal: Discharge to home or other facility with appropriate resources  Outcome: Progressing  Flowsheets (Taken 9/24/2024 2018 by Marina Martinez RN)  Discharge to home or other facility with appropriate resources: Identify barriers to discharge with patient and caregiver     Problem: Safety - Adult  Goal: Free from fall injury  Outcome: Progressing  Flowsheets (Taken 9/26/2024 0321)  Free From Fall Injury: Instruct family/caregiver on patient safety     Problem: Musculoskeletal - Adult  Goal: Return mobility to safest level of function  Outcome: Progressing  Flowsheets (Taken 9/26/2024 0321)  Return Mobility to Safest Level of Function:   Assess patient stability and activity tolerance for standing, transferring and ambulating with or without assistive devices   Assist with transfers and ambulation using safe patient handling equipment as needed   Ensure adequate protection for wounds/incisions during mobilization     Problem: Confusion  Goal: Confusion, delirium, dementia, or psychosis is improved or at baseline  Description: INTERVENTIONS:  1. Assess for possible contributors to thought disturbance, including medications, impaired vision or hearing, underlying metabolic abnormalities, dehydration, psychiatric diagnoses, and notify attending LIP  2. [place] high risk fall precautions, as indicated  3. Provide frequent short contacts to provide reality reorientation, refocusing and direction  4. Decrease environmental stimuli, including noise as appropriate  5. Monitor and intervene to maintain adequate nutrition, hydration, elimination, sleep and activity  6. If unable to ensure safety without constant attention obtain sitter and review sitter guidelines with assigned personnel  7. Initiate Psychosocial CNS and Spiritual Care consult, as indicated  Outcome: Progressing

## 2024-09-26 NOTE — CARE COORDINATION
GONZALO HICKS spoke with Kriss from [facility] and she is going to review patient and will return the call back to RN CM. RN CM following.   Electronically signed by Gabriella Toussaint RN on 9/26/2024 at 12:25 PM  [phone]

## 2024-09-26 NOTE — CARE COORDINATION
CASE MANAGEMENT DISCHARGE SUMMARY: Discharge order noted. Patient is returning to [facility] for PT/OT/skilled nursing.  DON notified and family notified of transportation time.     DISCHARGE DATE: 9/26/2024    DISCHARGED TO: [facility]isted [facility] Home Health Care       Discharging Facility  Name: [facility]  Address: [address] [place] [address]  Phone: [phone]  Fax: [phone]               REPORT NUMBER: [phone]               FAX NUMBER: [phone]    Discharging to Facility/ Agency   Name: [facility]  Address:  [address] [place] [address] [address], [place] [address]   Phone:  [phone]  Fax:  [phone]    TRANSPORTATION: [facility]             TIME: 4:00 PM   Yes Form completed and on chart    INSURANCE PRECERT OBTAINED: N/A    HENS/PASAAR COMPLETED: N/A    Yes VIKASH Updated   Yes Case Management   Yes Physician   Yes Nurse    Yes  Destination updated (SNF/HHC)    Yes  Whiteboard Note Updated with above    Yes  Home Care Order Verified     Jennifer YORK RN  Case Management  [phone]    Electronically signed by Jennifer Cornell RN on 9/26/2024 at 2:26 PM

## 2024-09-26 NOTE — PROGRESS NOTES
PALLIATIVE MEDICINE PROGRESS NOTE     Patient name:Donna Peoples    MRN:[id number] :10/17/1930  Room/Bed:G9Q-0131/5281-01    LOS: 1 day        ASSESSMENT/RECOMMENDATIONS     89 y.o. female with UTI.    Symptom management     Altered mental status -secondary to acute illness. Reinforced effects of hospital delirium as well as a UTI in elderly population.  Improved, however not at baseline. Continue to reorient.   Bacterial UTI -antibiotics per attending.  Elevated troponin - Management per attending, no new intervention.   Debility - PT/OT, will continue outpatient. Daughter has arranged a higher level of care in the patients assisted living apartment.   Goals of Care -see below.    Patient/Family Goals of Care :    24 - Goals of care conversation with the patient's daughter at bedside. Patient is generally alert and oriented. Has a complete healthcare power of  and living well. Plan is to return to her assisted living apartment if her cognitive status improves. We reviewed options of resuscitative measures including full code, DO NOT RESUSCITATE Comfort Care arrest, and DO NOT RESUSCITATE comfort care. Patient would not wish to receive any life prolonging measures including intubation, ventilation, CPR, or resuscitative measures in the event of respiratory distress or arrest. CODE STATUS updated to DNR Comfort Care arrest to align with patient's goals. She is okay with returning to the hospital as needed for acute concerns. May benefit from outpatient palliative care pending discharge planning.     Disposition/Discharge Plan :    Return to assisted living with skilled services.     Advance Directives:  Code status:  DNR-CC  Decision Maker: Whit Neff    Case Discussed with:  patient, floor RN, daughter.     Thank you for allowing us to participate in the care of this patient.     SUBJECTIVE     Chief Complaint: Altered mental status, falls.     Last 24 hours:   Patient was more confused yesterday 
4 Eyes Skin Assessment     NAME:  Donna Peoples  YOB: 1930  MEDICAL RECORD NUMBER:  [id number]    The patient is being assessed for  Admission    I agree that at least one RN has performed a thorough Head to Toe Skin Assessment on the patient. ALL assessment sites listed below have been assessed.      Areas assessed by both nurses:    Head, Face, Ears, Shoulders, Back, Chest, Arms, Elbows, Hands, Sacrum. Buttock, Coccyx, Ischium, Legs. Feet and Heels, and Under Medical Devices         Does the Patient have a Wound? No noted wound(s)       Kingsley Prevention initiated by RN: Yes  Wound Care Orders initiated by RN: No    Pressure Injury (Stage 3,4, Unstageable, DTI, NWPT, and Complex wounds) if present, place Wound referral order by RN under : No    New Ostomies, if present place, Ostomy referral order under : No     Nurse 1 eSignature: Electronically signed by Marina Martinez RN on 9/24/24 at 4:55 AM EDT    **SHARE this note so that the co-signing nurse can place an eSignature**    Nurse 2 eSignature: Electronically signed by MELANI SHER RN on 9/24/24 at 7:17 PM EDT   
Discharge orders acknowledged by RN . Discharge teaching completed with pt and family. AVS reviewed and all questions answered. Medication regimen reviewed and pt understands schedule.E-scripts sent to RX . Follow up appointments also reviewed with pt and patient's daughter Whit. IV removed. Bedside monitor removed from pt. Pt vitals WNL. Pt discharged with all belongings to daughter with  Pt transported off of unit via stretcher. No complications.     
Hospitalist   Progress Note    Patient Name: Donna Peoples  PCP: John Glasgow  Date of Admission: 9/23/2024    Chief Complaint on Admission: Multiple falls and AMS  Chief diagnosis after evaluation: Acute cystitis without hematuria    Brief Synopsis: Patient is a 89 y.o. woman who has a past medical history of Hypertension. who was admitted on 9/23/2024 for evaluation and treatment of acute cystitis without hematuria with associated metabolic encephalopathy    Pt Seen/Examined and Chart Reviewed.     Subjective: Pt is feeling better and has no new complaints    Objective:  Allergies  Patient has no known allergies.    Medications    Scheduled Meds:   erythromycin  1 cm Both Eyes Nightly    latanoprost  1 drop Both Eyes Nightly    brimonidine  1 drop Both Eyes BID    And    timolol  1 drop Both Eyes BID    polyvinyl alcohol  1 drop Ophthalmic Nightly    sodium chloride flush  5-40 mL IntraVENous 2 times per day    enoxaparin  30 mg SubCUTAneous Daily    amLODIPine  5 mg Oral Daily    oyster shell calcium w/D  1 tablet Oral Daily    metoprolol succinate  50 mg Oral Daily    Vitamin D  2,000 Units Oral QPM    aspirin  81 mg Oral Daily    cefTRIAXone (ROCEPHIN) IV  1,000 mg IntraVENous Q24H     Infusions:   sodium chloride       PRN Meds:  polyvinyl alcohol, sodium chloride flush, sodium chloride, ondansetron **OR** ondansetron, acetaminophen **OR** acetaminophen, polyethylene glycol, hydrALAZINE    Physical    VITALS:  BP (!) 145/86   Pulse 79   Temp 97.5 °F (36.4 °C) (Oral)   Resp 20   Ht 1.651 m (5' 5\")   Wt 42.4 kg (93 lb 7.6 oz)   SpO2 94%   BMI 15.56 kg/m²   CONSTITUTIONAL:  WD/WN 89 y.o. year-old female who is awake, alert, cooperative, no apparent distress, and appears stated age  EYES:  Lids and lashes normal, PERRL, EOMI, sclera clear, conjunctiva normal  ENT:  NC/AT, MMM    NECK:  Supple, symmetrical, trachea midline, no adenopathy  HEMATOLOGIC/LYMPHATICS:  no cervical, supraclavicular or axillary 
Hospitalist   Progress Note    Patient Name: Donna Peoples  PCP: Jonh Glasgow  Date of Admission: 9/23/2024    Chief Complaint on Admission: Multiple falls and AMS  Chief diagnosis after evaluation: Acute cystitis without hematuria    Brief Synopsis: Patient is a 89 y.o. woman who has a past medical history of Hypertension. who was admitted on 9/23/2024 for evaluation and treatment of acute cystitis without hematuria with associated metabolic encephalopathy    Pt Seen/Examined and Chart Reviewed.     Subjective: Pt has no new complaints. Spoke with daughter at bedside    Objective:  Allergies  Patient has no known allergies.    Medications    Scheduled Meds:   erythromycin  1 cm Both Eyes Nightly    latanoprost  1 drop Both Eyes Nightly    brimonidine  1 drop Both Eyes BID    And    timolol  1 drop Both Eyes BID    polyvinyl alcohol  1 drop Ophthalmic Nightly    sodium chloride flush  5-40 mL IntraVENous 2 times per day    enoxaparin  30 mg SubCUTAneous Daily    amLODIPine  5 mg Oral Daily    oyster shell calcium w/D  1 tablet Oral Daily    metoprolol succinate  50 mg Oral Daily    Vitamin D  2,000 Units Oral QPM    aspirin  81 mg Oral Daily    cefTRIAXone (ROCEPHIN) IV  1,000 mg IntraVENous Q24H     Infusions:   sodium chloride       PRN Meds:  polyvinyl alcohol, sodium chloride flush, sodium chloride, ondansetron **OR** ondansetron, acetaminophen **OR** acetaminophen, polyethylene glycol, hydrALAZINE    Physical    VITALS:  /77   Pulse 72   Temp 97.5 °F (36.4 °C) (Oral)   Resp 21   Ht 1.651 m (5' 5\")   Wt 42.2 kg (93 lb 0.6 oz)   SpO2 91%   BMI 15.48 kg/m²   CONSTITUTIONAL:  WD/WN 89 y.o. year-old female who is awake, alert, cooperative, no apparent distress, and appears stated age  EYES:  Lids and lashes normal, PERRL, EOMI, sclera clear, conjunctiva normal  ENT:  NC/AT, MMM    NECK:  Supple, symmetrical, trachea midline, no adenopathy  HEMATOLOGIC/LYMPHATICS:  no cervical, supraclavicular or 
Lab came to draw am labs, RN assisted phlebotomist to help pt remain calm, pt kept jerking arm away, unable to get, Lab will try again at 0800  
Occupational Therapy  Facility/Department: [address] PROGRESSIVE CARE  Occupational Therapy Initial Assessment    Name: Donna Peoples  : 10/17/1930  MRN: [id number]  Date of Service: 2024    Discharge Recommendations:  3-5 sessions per week, Patient would benefit from continued therapy after discharge  OT Equipment Recommendations  Other: defer to d/c facility    Donna Peoples scored a 15/ on the AM-PAC ADL Inpatient form. Current research shows that an AM-PAC score of 17 or less is typically not associated with a discharge to the patient's home setting. Based on the patient's AM-PAC score and their current ADL deficits, it is recommended that the patient have 3-5 sessions per week of Occupational Therapy at d/c to increase the patient's independence.  Please see assessment section for further patient specific details.    If patient discharges prior to next session this note will serve as a discharge summary.  Please see below for the latest assessment towards goals.       Patient Diagnosis(es): The primary encounter diagnosis was Hypertensive urgency. Diagnoses of Multiple falls, Injury of head, initial encounter, Confusion, and Acute cystitis without hematuria were also pertinent to this visit.  Past Medical History:  has a past medical history of Hypertension.  Past Surgical History:  has a past surgical history that includes fracture surgery and Forearm surgery (Right, 2022).    Treatment Diagnosis: decreased cognition/balance/ADLs/fxl mobility    Assessment  Performance deficits / Impairments: Decreased functional mobility ;Decreased balance;Decreased safe awareness;Decreased ADL status;Decreased cognition;Decreased strength  Assessment: Pt is a 89 yo female admitted for UTI/AMS. PMH includes HTN, elevated troponin, R wrist fx . PTA, pt living at [facility] where she is typically independent with ADLs and fxl mobility with 4WW. This date, pt pleasantly confused functioning below baseline 
Occupational Therapy  Facility/Department: [address] PROGRESSIVE CARE  Occupational Therapy Treatment and Tentative D/C      Name: Donna Peoples  : 10/17/1930  MRN: [id number]  Date of Service: 2024    Discharge Recommendations: Donna Peoples scored a 15/24 on the AM-PAC ADL Inpatient form. Current research shows that an AM-PAC score of 17 or less is typically not associated with a discharge to the patient's home setting. Based on the patient's AM-PAC score and their current ADL deficits, it is recommended that the patient have 3-5 sessions per week of Occupational Therapy at d/c to increase the patient's independence.  Please see assessment section for further patient specific details.    If patient discharges prior to next session this note will serve as a discharge summary.  Please see below for the latest assessment towards goals.     3-5 sessions per week, Patient would benefit from continued therapy after discharge  OT Equipment Recommendations  Equipment Needed: No       Patient Diagnosis(es): The primary encounter diagnosis was Hypertensive urgency. Diagnoses of Multiple falls, Injury of head, initial encounter, Confusion, and Acute cystitis without hematuria were also pertinent to this visit.  Past Medical History:  has a past medical history of Hypertension.  Past Surgical History:  has a past surgical history that includes fracture surgery and Forearm surgery (Right, 2022).    Treatment Diagnosis: decreased cognition/balance/ADLs/fxl mobility      Assessment  Performance deficits / Impairments: Decreased functional mobility ;Decreased balance;Decreased safe awareness;Decreased ADL status;Decreased cognition;Decreased strength  Assessment: Pt tolerated session fair. Pt completes transfers with CGA and use of stedy and Min A and use of RW. Pt able to ambulate short distances in room with RW and Min/Mod A. Pt unsteady and impulsive on feet due to decreased cognition. Pt completes toileting with Max 
Occupational Therapy  Treatment Session  Should patient be discharged prior to another treatment session, this note shall serve as the discharge summary.     Pt seen in room, agreed to OT.  Pt now with d/c orders and transportation set for 4pm for return to assisted living with increased assist.  Pt educated on calling for help for EVERY transfer and walk.  She verbalized understanding and stated \"I will be very cautious\".  Discussed using w/c for going to mass and eventual return to dining room until therapy determines she is no longer a fall risk.  Reinforcement of walker safety and calling for assist provided a few times.  Recommend HHOT and hands on assist at home.  Seen from [id number]. Maddy Yuen OTR/L #[id number] 9/26/2024 2:48 PM    
Patient arrived via stretcher, unable to walk at this time d/t weakness and hx of falls, family at bedside until patient settled into room. Hygiene care performed. A&Ox2, to self and situation, reoriented easily, Pt hypertensive, /88, PRN hydralazine given, CMU notified and confirmed. Standard safety precautions in place.   
Patient attempting to get out of bed and pulling at lines, disoriented x 4, hallucinating, combative, Notified Yury NP, orders placed for bilateral restraints. Notified and made daughter aware of circumstances, daughter Whit Neff.  
Patient keeps putting her legs over rails, attempting to get out of bed, also unable to answer assessment questions, appears to be having some hallucinations, RN will request virtual . Electronically signed by Marina Martinez RN on 9/24/2024 at 5:10 AM    
Patient moved from 5275 to 5281 to be closer to nurses' station due to impulsivity and getting out of bed.    Electronically signed by MELANI SHER RN on 9/24/2024 at 6:12 PM    
Physical Therapy  Facility/Department: [address] PROGRESSIVE CARE  Daily Treatment Note  NAME: Donna Peoples  : 10/17/1930  MRN: [id number]    Date of Service: 2024    Discharge Recommendations:  Subacute/Skilled Nursing Facility, Patient would benefit from continued therapy after discharge   PT Equipment Recommendations  Equipment Needed: No    Patient Diagnosis(es): The primary encounter diagnosis was Hypertensive urgency. Diagnoses of Multiple falls, Injury of head, initial encounter, Confusion, and Acute cystitis without hematuria were also pertinent to this visit.    Assessment  Assessment: Pt more alert, able to complete transfers with Min-CGA, short-distance ambulation with RW, CGA.  Still experiences intermittent posterior LOB with activity, requiring Min A to correct.  Still requires verbal cueing for navigation with RW, and completion of ADLs.  Not independent with transfers/ambulation at this time.  Pt requires additional therapy in the acute setting to improve strength, balance, endurance, and independence with mobility tasks.  Pt would benefit from continued therapy at low-moderate frequency upon D/C to continue to address these deficits.     Donna Peoples scored a 17/24 on the AM-PAC short mobility form. Current research shows that an AM-PAC score of 17 or less is typically not associated with a discharge to the patient's home setting. Based on the patient's AM-PAC score and their current functional mobility deficits, it is recommended that the patient have 3-5 sessions per week of Physical Therapy at d/c to increase the patient's independence.  Please see assessment section for further patient specific details.    If patient discharges prior to next session this note will serve as a discharge summary.  Please see below for the latest assessment towards goals.         Plan  Physical Therapy Plan  General Plan: 3-5 times per week  Current Treatment Recommendations: Strengthening;Balance 
Pt. Seen and examined earlier today by our group. I have reviewed the History and Physical, and agree with the current plan of care. We will continue to follow this patient during this hospitalization.    Rivera Jarquin MD    
[facility]  DYSPHAGIA BEDSIDE SWALLOW EVALUATION     Patient: Donna Peoples   : 10/17/1930   MRN: [id number]      Evaluation Date: 2024   Admitting Diagnosis:   Confusion [R41.0]  UTI (urinary tract infection) [N39.0]  Hypertensive urgency [I16.0]  Acute cystitis without hematuria [N30.00]  Multiple falls [R29.6]  Injury of head, initial encounter [S09.90XA]   has a past medical history of Hypertension.   has a past surgical history that includes fracture surgery and Forearm surgery (Right, 2022).  Allergies: NKA                                             Onset date: 2024     Reason for referral: SLP evaluation orders received due to coughing with intake   MD order states:   Reason for SLP? choked while eating       Assessment/Plan:     Dysphagia Impressions/Diagnosis: Oropharyngeal Dysphagia   OROPHARYNGEAL DYSPHAGIA DIAGNOSIS:   Accepted and tolerated evaluation at bedside.  Patient alert and cooperative, but confused and distractible. Patient verbally responsive, but poor historian. Requires cueing to follow dx. Oriented to self only.  Pre-feeding assessment: patient with dry mouth; upper and lower dentures placed using adhesive (but concern for loose lower even with adhesive); discoordinated lingual movement; gag intact; concern for delayed volitional swallow; weak cough.  Clinically, patient appears to present with moderate oropharyngeal dysphagia characterized by reduced mastication and reduced lingual manipulation with concerns/suspicions for potential for delayed swallow initiation and decreased laryngeal elevation. Prolonged, somewhat labored/effortful bolus formation with soft solids with patient report for preference for minced solids which are tolerated with prolonged but adequate bolus formation. No overt signs/symptoms of penetration/aspiration.  Recommend minced/moist diet texture with thin liquids; meds crushed in puree/pudding for now.  ST to follow for 
Spiritual Health History and Assessment/Progress Note  [facility]    Rituals, Rites and Sacraments,  , Adjustment to illness,      Name: Donna Peoples MRN: [id number]    Age: 89 y.o.     Sex: female   Language: English   Adventism: Jewish   UTI (urinary tract infection)     Date: 9/25/2024            Total Time Calculated: 25 min              Spiritual Assessment began in [facility] PROGRESSIVE CARE        Referral/Consult From: Rounding   Encounter Overview/Reason: Rituals, Rites and Sacraments  Service Provided For: Patient and family together    [place], Belief, Meaning:   Patient identifies as spiritual  Family/Friends identify as spiritual      Importance and Influence:  Patient has no beliefs influential to healthcare decision-making identified during this visit  Family/Friends have no beliefs influential to healthcare decision-making identified during this visit    Community:  Patient feels well-supported. Support system includes: Extended family  Family/Friends are connected with a spiritual community:    Assessment and Plan of Care:     Patient Interventions include: Facilitated expression of thoughts and feelings, Explored spiritual coping/struggle/distress, Engaged in theological reflection, Affirmed coping skills/support systems, and Provided sacramental/Jewish ritual  Family/Friends Interventions include: Engaged in theological reflection and Affirmed coping skills/support systems    Patient Plan of Care: No spiritual needs identified for follow-up  Family/Friends Plan of Care: No spiritual needs identified for follow-up    Electronically signed by Chaplain CINTHIA on 9/25/2024 at 2:26 PM   
This writer attempted x2 to give report to facility nurse . Left another voice message at this time.  
This writer left a voice message for facility nurse to give report on patient at this time.  
Liquid Consistency Recommendation:  Thin liquids   Recommended form of Meds: Crushed in puree/pudding     Compensatory Swallowing Strategies:  Upright as possible with all PO intake , Assist Feed , Small bites/sips , Remain upright 30-45 min     SHORT TERM DYSPHAGIA GOALS/PLAN OF CARE: Speech therapy for dysphagia tx 2-5 times/week  Goals  Pt will functionally tolerate recommended diet with no overt clinical s/s of aspiration ongoing  Pt will advance to least restrictive diet as indicated not met       Dysphagia Therapeutic Intervention:  Bolus Control Exercise, Oral Care, Diet Tolerance Monitoring , Patient/Family Education , Therapeutic Trials with SLP     Dysphagia Prognosis: guarded  Barriers to progress: co-morbidities, mental status    Discharge Recommendations: Recommend ongoing SLP for dysphagia therapy upon discharge from hospital     Subjective:     Baseline Level of Function:  Living status: admitted from Independent Living  Baseline diet: regular/thin  Dysphagia history: none on record  GI history: none on record  ENT history: none on record     CHART REVIEW:  2024 admitted with c/o increased confusion and frequent falls . MD ADMISSION H&P HPI: \"Donna Peoples is a 89 y.o. female, assisted living facility with pmh of hypertension, hard of hearing and visually impaired who presents with increased confusion and frequent falls over the past couple of days.  Patient is unable to provide any history due to her altered mental status most of the history is obtained from the daughter who does not live with her and got a scant report from the assisted living facility.\"   Consults noted: Palliative     IMAGIN2024 CXR: Borderline cardiomegaly with mild central pulmonary congestion. Hazy right perihilar opacity extending into the lung base which could represent atelectasis and/or infiltrate and a small right pleural effusion. Tiny left pleural effusion which is unchanged    Current Diet Level : Minced 
therapy for dysphagia tx 2-5 times/week  Goals  Pt will functionally tolerate recommended diet with no overt clinical s/s of aspiration ongoing  Pt will advance to least restrictive diet as indicated maintain       Dysphagia Therapeutic Intervention:  Bolus Control Exercise, Oral Care, Diet Tolerance Monitoring , Patient/Family Education , Therapeutic Trials with SLP     Dysphagia Prognosis: good  Barriers to progress: co-morbidities    Discharge Recommendations: Do not anticipate need for further speech/dysphagia therapy upon discharge from hospital     Subjective:     Baseline Level of Function:  Living status: admitted from Independent Living  Baseline diet: regular/thin  Dysphagia history: none on record  GI history: none on record  ENT history: none on record     CHART REVIEW:  2024 admitted with c/o increased confusion and frequent falls . MD ADMISSION H&P HPI: \"Donna Peoples is a 89 y.o. female, assisted living facility with pmh of hypertension, hard of hearing and visually impaired who presents with increased confusion and frequent falls over the past couple of days.  Patient is unable to provide any history due to her altered mental status most of the history is obtained from the daughter who does not live with her and got a scant report from the assisted living facility.\"   Consults noted: Palliative     IMAGIN2024 CXR: Borderline cardiomegaly with mild central pulmonary congestion. Hazy right perihilar opacity extending into the lung base which could represent atelectasis and/or infiltrate and a small right pleural effusion. Tiny left pleural effusion which is unchanged    Current Diet Level : Minced and Moist texture, Thin liquids    Comments regarding toleration: no concerns/complaints; family reports optimal diet texture    Respiratory Status: Room Air     Pain:  Did not state    Vision: Adequate for assessment/tx needs  Hearing: Adequate for assessment/tx 
  Social/Functional History  Social/Functional History  Type of Home: Facility ([facility])  Home Equipment: Walker - 4-Wheeled  Has the patient had two or more falls in the past year or any fall with injury in the past year?: Yes  ADL Assistance: Independent  Homemaking Responsibilities: No  Ambulation Assistance: Independent (4WW)  Transfer Assistance: Independent  Active : No    Vision/Hearing  Vision  Vision: Within Functional Limits  Hearing  Hearing: [place]    Cognition   Orientation  Orientation Level: Oriented to person;Disoriented to place;Oriented to situation;Oriented to time  Cognition  Overall Cognitive Status: Exceptions  Arousal/Alertness: Appears intact  Following Commands: Follows one step commands consistently  Attention Span: Attends with cues to redirect  Safety Judgement: Decreased awareness of need for safety;Decreased awareness of need for assistance  Insights: Decreased awareness of deficits  Initiation: Requires cues for some  Sequencing: Requires cues for some  Cognition Comment: Pt is pleasantly confused    Objective  Observation/Palpation  Observation: Pt on RA on arrival. Telemetry monitor in place.      Bed mobility  Bed Mobility Comments: Not completed as pt seated in recliner on arrival and at the end of the session.  Transfers  Sit to Stand: Contact guard assistance  Stand to Sit: Contact guard assistance  Comment: Use of RW for transfers, verbal cues required for hand placement  Ambulation  Surface: Level tile  Device: Rolling Walker  Assistance: Minimal assistance  Quality of Gait: Intermittent posterior lean, decreased speed, no overt losses of balance but unsteady, shortened step lengths and heights  Distance: 30' forward and backward- ambulation limited by maintenance in room     Balance  Sitting - Static:  (Close SBA)  Standing - Static:  (CGA with B UE support on RW)  Standing - Dynamic:  (Min A with B UE support on RW)  Exercise Treatment: Pt performed seated AROM 
Previous Treatment: Not applicable  Referring Practitioner: Dara Pantoja MD  Referral Date : 09/24/24  Diagnosis: UTI; Uncontrolled HTN; Acute metabolic encephalopathy  Follows Commands: Impaired  Other (Comment): Max cues to follow simple commands  Subjective  Subjective: Pleasantly confused.  Agreeable to evaluation.         Social/Functional History  Social/Functional History  Type of Home: Facility ([facility])  Home Equipment: Walker - 4-Wheeled  Has the patient had two or more falls in the past year or any fall with injury in the past year?: Yes  ADL Assistance: Independent  Homemaking Responsibilities: No  Ambulation Assistance: Independent (4WW)  Transfer Assistance: Independent  Active : No    Vision/Hearing  Vision  Vision: Impaired  Hearing  Hearing: Exceptions to WFL  Hearing Exceptions: Hard of hearing/hearing concerns      Cognition   Orientation  Overall Orientation Status: Impaired  Orientation Level: Oriented to person;Disoriented to place;Disoriented to time;Disoriented to situation    Objective    AROM RLE (degrees)  RLE General AROM: Hip Flex, Knee Flex/Ext mildly decreased  AROM LLE (degrees)  LLE General AROM: Hip Flex, Knee Flex/Ext mildly decreased    Strength RLE  Strength RLE: Exception  Comment: Hip Flex, Knee Flex/Ext mildly decreased  Strength LLE  Strength LLE: Exception  Comment: Hip Flex, Knee Flex/Ext mildly decreased    Bed mobility  Rolling to Right: Contact guard assistance;Minimal assistance  Supine to Sit: Contact guard assistance;Minimal assistance    Transfers  Sit to Stand: Minimal Assistance  Stand to Sit: Minimal Assistance    Ambulation  Surface: Level tile  Device: Rolling Walker  Assistance: Minimal assistance;2 Person assistance  Quality of Gait: Intermittent posterior lean, max cues for navigation, Min A x 2 for trunk and walker control.  Gait Deviations: Slow [place];Shuffles  Distance: 15', 25'     Balance  Comments: Able to maintain sitting 
Patient's belongings returned

## 2024-09-26 NOTE — CARE COORDINATION
GONZALO HICKS spoke with Whit, patient's daughter at bedside. Patient is sleeping. Patient does not have a qualifying 3 midnight stay under Medicare guidelines for SNF placement. For this reason, this RN CM spoke with daughter at bedside re: ARU and going back to [facility] [place] [facility] assisted living. Daughter Whit is concerned about the patient not able to go back to assisted living. Daughter Whit would like the patient to be considered for acute rehab. RN CM explained that the patient's evaluation from physical therapy and the expectation at an acute rehab. Whit would like to move forward with the referral for the acute rehab [facility] per daughter. Freedom of choice list provided. RN CM will continue to follow.   The Plan for Transition of Care is related to the following treatment goals: Strength    The Patient and/or patient representative Whit Neff was provided with a choice of provider and agrees   with the discharge plan. [x] Yes [] No    Freedom of choice list was provided with basic dialogue that supports the patient's individualized plan of care/goals, treatment preferences and shares the quality data associated with the providers. [x] Yes [] No    Electronically signed by Gabriella Toussaint RN on 9/26/2024 at 12:08 PM

## 2024-09-26 NOTE — DISCHARGE INSTR - DIET

## 2024-09-26 NOTE — PLAN OF CARE
Problem: Discharge Planning  Goal: Discharge to home or other facility with appropriate resources  9/26/2024 1010 by Jaylin Salazar RN  Outcome: Progressing  9/26/2024 0321 by Татьяна Cai RN  Outcome: Progressing  Flowsheets (Taken 9/24/2024 2018 by Marina Martinez, RN)  Discharge to home or other facility with appropriate resources: Identify barriers to discharge with patient and caregiver     Problem: Skin/Tissue Integrity  Goal: Absence of new skin breakdown  Description: 1.  Monitor for areas of redness and/or skin breakdown  2.  Assess vascular access sites hourly  3.  Every 4-6 hours minimum:  Change oxygen saturation probe site  4.  Every 4-6 hours:  If on nasal continuous positive airway pressure, respiratory therapy assess nares and determine need for appliance change or resting period.  Outcome: Progressing     Problem: ABCDS Injury Assessment  Goal: Absence of physical injury  Outcome: Progressing     Problem: Safety - Adult  Goal: Free from fall injury  9/26/2024 1010 by Jaylin Salazar RN  Outcome: Progressing  9/26/2024 0321 by Татьяна Cai RN  Outcome: Progressing  Flowsheets (Taken 9/26/2024 0321)  Free From Fall Injury: Instruct family/caregiver on patient safety     Problem: Safety - Medical Restraint  Goal: Remains free of injury from restraints (Restraint for Interference with Medical Device)  Description: INTERVENTIONS:  1. Determine that other, less restrictive measures have been tried or would not be effective before applying the restraint  2. Evaluate the patient's condition at the time of restraint application  3. Inform patient/family regarding the reason for restraint  4. Q2H: Monitor safety, psychosocial status, comfort, nutrition and hydration  Outcome: Progressing     Problem: Neurosensory - Adult  Goal: Achieves stable or improved neurological status  Outcome: Progressing  Goal: Achieves maximal functionality and self care  Outcome: Progressing     Problem: Respiratory -

## 2024-09-28 LAB
BACTERIA BLD CULT ORG #2: NORMAL
BACTERIA BLD CULT: NORMAL

## 2024-10-24 PROBLEM — R79.89 ELEVATED TROPONIN: Status: RESOLVED | Noted: 2024-09-24 | Resolved: 2024-10-24

## 2024-10-24 PROBLEM — N39.0 UTI (URINARY TRACT INFECTION): Status: RESOLVED | Noted: 2024-09-24 | Resolved: 2024-10-24

## 2025-05-06 ENCOUNTER — HOSPITAL ENCOUNTER (INPATIENT)
Age: 89
LOS: 2 days | Discharge: NURSING, REHAB OR LONG-TERM CARE | DRG: 069 | End: 2025-05-10
Attending: EMERGENCY MEDICINE
Payer: MEDICARE

## 2025-05-06 ENCOUNTER — APPOINTMENT (OUTPATIENT)
Dept: CT IMAGING | Age: 89
DRG: 069 | End: 2025-05-06
Payer: MEDICARE

## 2025-05-06 DIAGNOSIS — G45.9 TIA (TRANSIENT ISCHEMIC ATTACK): Primary | ICD-10-CM

## 2025-05-06 LAB
ALBUMIN SERPL-MCNC: 3.8 G/DL (ref 3.4–5)
ALBUMIN/GLOB SERPL: 1.2 {RATIO} (ref 1.1–2.2)
ALP SERPL-CCNC: 84 U/L (ref 40–129)
ALT SERPL-CCNC: 12 U/L (ref 10–40)
ANION GAP SERPL CALCULATED.3IONS-SCNC: 9 MMOL/L (ref 3–16)
AST SERPL-CCNC: 26 U/L (ref 15–37)
BACTERIA URNS QL MICRO: ABNORMAL /HPF
BASOPHILS # BLD: 0 K/UL (ref 0–0.2)
BASOPHILS NFR BLD: 0.7 %
BILIRUB SERPL-MCNC: <0.2 MG/DL (ref 0–1)
BILIRUB UR QL STRIP.AUTO: NEGATIVE
BUN SERPL-MCNC: 20 MG/DL (ref 7–20)
CALCIUM SERPL-MCNC: 9.4 MG/DL (ref 8.3–10.6)
CHLORIDE SERPL-SCNC: 98 MMOL/L (ref 99–110)
CHP ED QC CHECK: YES
CLARITY UR: ABNORMAL
CO2 SERPL-SCNC: 27 MMOL/L (ref 21–32)
COLOR UR: YELLOW
CREAT SERPL-MCNC: 0.9 MG/DL (ref 0.6–1.2)
DEPRECATED RDW RBC AUTO: 14 % (ref 12.4–15.4)
EKG ATRIAL RATE: 66 BPM
EKG DIAGNOSIS: NORMAL
EKG P AXIS: 89 DEGREES
EKG P-R INTERVAL: 174 MS
EKG Q-T INTERVAL: 416 MS
EKG QRS DURATION: 78 MS
EKG QTC CALCULATION (BAZETT): 436 MS
EKG R AXIS: 68 DEGREES
EKG T AXIS: 80 DEGREES
EKG VENTRICULAR RATE: 66 BPM
EOSINOPHIL # BLD: 0.1 K/UL (ref 0–0.6)
EOSINOPHIL NFR BLD: 2.4 %
EPI CELLS #/AREA URNS AUTO: 1 /HPF (ref 0–5)
GFR SERPLBLD CREATININE-BSD FMLA CKD-EPI: 59 ML/MIN/{1.73_M2}
GLUCOSE BLD-MCNC: 135 MG/DL
GLUCOSE BLD-MCNC: 135 MG/DL (ref 70–99)
GLUCOSE SERPL-MCNC: 128 MG/DL (ref 70–99)
GLUCOSE UR STRIP.AUTO-MCNC: NEGATIVE MG/DL
HCT VFR BLD AUTO: 40.9 % (ref 36–48)
HGB BLD-MCNC: 13.5 G/DL (ref 12–16)
HGB UR QL STRIP.AUTO: NEGATIVE
HYALINE CASTS #/AREA URNS AUTO: 1 /LPF (ref 0–8)
INR PPP: 0.98 (ref 0.85–1.15)
KETONES UR STRIP.AUTO-MCNC: NEGATIVE MG/DL
LEUKOCYTE ESTERASE UR QL STRIP.AUTO: ABNORMAL
LYMPHOCYTES # BLD: 1.4 K/UL (ref 1–5.1)
LYMPHOCYTES NFR BLD: 26.6 %
MCH RBC QN AUTO: 30 PG (ref 26–34)
MCHC RBC AUTO-ENTMCNC: 33.1 G/DL (ref 31–36)
MCV RBC AUTO: 90.8 FL (ref 80–100)
MONOCYTES # BLD: 0.5 K/UL (ref 0–1.3)
MONOCYTES NFR BLD: 8.3 %
NEUTROPHILS # BLD: 3.4 K/UL (ref 1.7–7.7)
NEUTROPHILS NFR BLD: 62 %
NITRITE UR QL STRIP.AUTO: NEGATIVE
PERFORMED ON: ABNORMAL
PH UR STRIP.AUTO: 7.5 [PH] (ref 5–8)
PLATELET # BLD AUTO: 234 K/UL (ref 135–450)
PMV BLD AUTO: 7.3 FL (ref 5–10.5)
POTASSIUM SERPL-SCNC: 4.3 MMOL/L (ref 3.5–5.1)
PROT SERPL-MCNC: 7 G/DL (ref 6.4–8.2)
PROT UR STRIP.AUTO-MCNC: 100 MG/DL
PROTHROMBIN TIME: 13.2 SEC (ref 11.9–14.9)
RBC # BLD AUTO: 4.5 M/UL (ref 4–5.2)
RBC CLUMPS #/AREA URNS AUTO: 1 /HPF (ref 0–4)
SODIUM SERPL-SCNC: 134 MMOL/L (ref 136–145)
SP GR UR STRIP.AUTO: 1.02 (ref 1–1.03)
TROPONIN, HIGH SENSITIVITY: 24 NG/L (ref 0–14)
UA COMPLETE W REFLEX CULTURE PNL UR: YES
UA DIPSTICK W REFLEX MICRO PNL UR: YES
URN SPEC COLLECT METH UR: ABNORMAL
UROBILINOGEN UR STRIP-ACNC: 1 E.U./DL
WBC # BLD AUTO: 5.4 K/UL (ref 4–11)
WBC #/AREA URNS AUTO: 207 /HPF (ref 0–5)

## 2025-05-06 PROCEDURE — G0378 HOSPITAL OBSERVATION PER HR: HCPCS

## 2025-05-06 PROCEDURE — 85610 PROTHROMBIN TIME: CPT

## 2025-05-06 PROCEDURE — 6360000004 HC RX CONTRAST MEDICATION: Performed by: PHYSICIAN ASSISTANT

## 2025-05-06 PROCEDURE — 85025 COMPLETE CBC W/AUTO DIFF WBC: CPT

## 2025-05-06 PROCEDURE — 2500000003 HC RX 250 WO HCPCS: Performed by: NURSE PRACTITIONER

## 2025-05-06 PROCEDURE — 96374 THER/PROPH/DIAG INJ IV PUSH: CPT

## 2025-05-06 PROCEDURE — 84484 ASSAY OF TROPONIN QUANT: CPT

## 2025-05-06 PROCEDURE — 6370000000 HC RX 637 (ALT 250 FOR IP): Performed by: HOSPITALIST

## 2025-05-06 PROCEDURE — 6360000002 HC RX W HCPCS: Performed by: NURSE PRACTITIONER

## 2025-05-06 PROCEDURE — 70450 CT HEAD/BRAIN W/O DYE: CPT

## 2025-05-06 PROCEDURE — 80053 COMPREHEN METABOLIC PANEL: CPT

## 2025-05-06 PROCEDURE — 81001 URINALYSIS AUTO W/SCOPE: CPT

## 2025-05-06 PROCEDURE — 6360000002 HC RX W HCPCS: Performed by: HOSPITALIST

## 2025-05-06 PROCEDURE — 96372 THER/PROPH/DIAG INJ SC/IM: CPT

## 2025-05-06 PROCEDURE — 99285 EMERGENCY DEPT VISIT HI MDM: CPT

## 2025-05-06 PROCEDURE — 87186 SC STD MICRODIL/AGAR DIL: CPT

## 2025-05-06 PROCEDURE — 87086 URINE CULTURE/COLONY COUNT: CPT

## 2025-05-06 PROCEDURE — 93005 ELECTROCARDIOGRAM TRACING: CPT | Performed by: EMERGENCY MEDICINE

## 2025-05-06 PROCEDURE — 96375 TX/PRO/DX INJ NEW DRUG ADDON: CPT

## 2025-05-06 PROCEDURE — 70496 CT ANGIOGRAPHY HEAD: CPT

## 2025-05-06 PROCEDURE — 93010 ELECTROCARDIOGRAM REPORT: CPT | Performed by: INTERNAL MEDICINE

## 2025-05-06 PROCEDURE — 87077 CULTURE AEROBIC IDENTIFY: CPT

## 2025-05-06 PROCEDURE — 6360000002 HC RX W HCPCS: Performed by: PHYSICIAN ASSISTANT

## 2025-05-06 PROCEDURE — 2500000003 HC RX 250 WO HCPCS: Performed by: HOSPITALIST

## 2025-05-06 RX ORDER — SODIUM CHLORIDE 0.9 % (FLUSH) 0.9 %
5-40 SYRINGE (ML) INJECTION PRN
Status: DISCONTINUED | OUTPATIENT
Start: 2025-05-06 | End: 2025-05-10 | Stop reason: HOSPADM

## 2025-05-06 RX ORDER — LABETALOL HYDROCHLORIDE 5 MG/ML
10 INJECTION, SOLUTION INTRAVENOUS EVERY 4 HOURS PRN
Status: DISCONTINUED | OUTPATIENT
Start: 2025-05-06 | End: 2025-05-10 | Stop reason: HOSPADM

## 2025-05-06 RX ORDER — TIMOLOL MALEATE 5 MG/ML
1 SOLUTION/ DROPS OPHTHALMIC 2 TIMES DAILY
Status: DISCONTINUED | OUTPATIENT
Start: 2025-05-06 | End: 2025-05-10 | Stop reason: HOSPADM

## 2025-05-06 RX ORDER — BRIMONIDINE TARTRATE 2 MG/ML
1 SOLUTION/ DROPS OPHTHALMIC 2 TIMES DAILY
Status: DISCONTINUED | OUTPATIENT
Start: 2025-05-06 | End: 2025-05-10 | Stop reason: HOSPADM

## 2025-05-06 RX ORDER — POLYETHYLENE GLYCOL 3350 17 G/17G
17 POWDER, FOR SOLUTION ORAL DAILY PRN
Status: DISCONTINUED | OUTPATIENT
Start: 2025-05-06 | End: 2025-05-10 | Stop reason: HOSPADM

## 2025-05-06 RX ORDER — ONDANSETRON 4 MG/1
4 TABLET, ORALLY DISINTEGRATING ORAL EVERY 8 HOURS PRN
Status: DISCONTINUED | OUTPATIENT
Start: 2025-05-06 | End: 2025-05-10 | Stop reason: HOSPADM

## 2025-05-06 RX ORDER — ATORVASTATIN CALCIUM 80 MG/1
80 TABLET, FILM COATED ORAL NIGHTLY
Status: DISCONTINUED | OUTPATIENT
Start: 2025-05-06 | End: 2025-05-10 | Stop reason: HOSPADM

## 2025-05-06 RX ORDER — AMLODIPINE BESYLATE 5 MG/1
5 TABLET ORAL DAILY
Status: DISCONTINUED | OUTPATIENT
Start: 2025-05-06 | End: 2025-05-08

## 2025-05-06 RX ORDER — METOPROLOL SUCCINATE 50 MG/1
50 TABLET, EXTENDED RELEASE ORAL NIGHTLY
Status: DISCONTINUED | OUTPATIENT
Start: 2025-05-06 | End: 2025-05-10 | Stop reason: HOSPADM

## 2025-05-06 RX ORDER — ONDANSETRON 2 MG/ML
4 INJECTION INTRAMUSCULAR; INTRAVENOUS EVERY 6 HOURS PRN
Status: DISCONTINUED | OUTPATIENT
Start: 2025-05-06 | End: 2025-05-10 | Stop reason: HOSPADM

## 2025-05-06 RX ORDER — SODIUM CHLORIDE 0.9 % (FLUSH) 0.9 %
5-40 SYRINGE (ML) INJECTION EVERY 12 HOURS SCHEDULED
Status: DISCONTINUED | OUTPATIENT
Start: 2025-05-06 | End: 2025-05-10 | Stop reason: HOSPADM

## 2025-05-06 RX ORDER — POLYETHYLENE GLYCOL 3350 17 G/17G
17 POWDER, FOR SOLUTION ORAL DAILY PRN
COMMUNITY

## 2025-05-06 RX ORDER — ASPIRIN 81 MG/1
81 TABLET, CHEWABLE ORAL DAILY
Status: DISCONTINUED | OUTPATIENT
Start: 2025-05-06 | End: 2025-05-08

## 2025-05-06 RX ORDER — ACETAMINOPHEN 325 MG/1
650 TABLET ORAL EVERY 4 HOURS PRN
COMMUNITY

## 2025-05-06 RX ORDER — BRIMONIDINE TARTRATE AND TIMOLOL MALEATE 2; 5 MG/ML; MG/ML
1 SOLUTION OPHTHALMIC EVERY 12 HOURS
Status: DISCONTINUED | OUTPATIENT
Start: 2025-05-06 | End: 2025-05-06 | Stop reason: SDUPTHER

## 2025-05-06 RX ORDER — ASPIRIN 300 MG/1
300 SUPPOSITORY RECTAL DAILY
Status: DISCONTINUED | OUTPATIENT
Start: 2025-05-06 | End: 2025-05-08

## 2025-05-06 RX ORDER — HEPARIN SODIUM 5000 [USP'U]/ML
5000 INJECTION, SOLUTION INTRAVENOUS; SUBCUTANEOUS 2 TIMES DAILY
Status: DISCONTINUED | OUTPATIENT
Start: 2025-05-06 | End: 2025-05-10 | Stop reason: HOSPADM

## 2025-05-06 RX ORDER — SODIUM CHLORIDE 9 MG/ML
INJECTION, SOLUTION INTRAVENOUS PRN
Status: DISCONTINUED | OUTPATIENT
Start: 2025-05-06 | End: 2025-05-10 | Stop reason: HOSPADM

## 2025-05-06 RX ORDER — HYDRALAZINE HYDROCHLORIDE 20 MG/ML
5 INJECTION INTRAMUSCULAR; INTRAVENOUS ONCE
Status: COMPLETED | OUTPATIENT
Start: 2025-05-06 | End: 2025-05-06

## 2025-05-06 RX ORDER — IOPAMIDOL 755 MG/ML
75 INJECTION, SOLUTION INTRAVASCULAR
Status: COMPLETED | OUTPATIENT
Start: 2025-05-06 | End: 2025-05-06

## 2025-05-06 RX ORDER — LATANOPROST 50 UG/ML
1 SOLUTION/ DROPS OPHTHALMIC NIGHTLY
Status: DISCONTINUED | OUTPATIENT
Start: 2025-05-06 | End: 2025-05-10 | Stop reason: HOSPADM

## 2025-05-06 RX ADMIN — ATORVASTATIN CALCIUM 80 MG: 80 TABLET, FILM COATED ORAL at 20:11

## 2025-05-06 RX ADMIN — TIMOLOL MALEATE 1 DROP: 5 SOLUTION OPHTHALMIC at 21:40

## 2025-05-06 RX ADMIN — WATER 1000 MG: 1 INJECTION INTRAMUSCULAR; INTRAVENOUS; SUBCUTANEOUS at 23:24

## 2025-05-06 RX ADMIN — IOPAMIDOL 75 ML: 755 INJECTION, SOLUTION INTRAVENOUS at 13:16

## 2025-05-06 RX ADMIN — LABETALOL HYDROCHLORIDE 10 MG: 5 INJECTION, SOLUTION INTRAVENOUS at 18:24

## 2025-05-06 RX ADMIN — METOPROLOL SUCCINATE 50 MG: 50 TABLET, EXTENDED RELEASE ORAL at 20:11

## 2025-05-06 RX ADMIN — LATANOPROST 1 DROP: 50 SOLUTION OPHTHALMIC at 21:40

## 2025-05-06 RX ADMIN — ASPIRIN 81 MG: 81 TABLET, CHEWABLE ORAL at 18:26

## 2025-05-06 RX ADMIN — HEPARIN SODIUM 5000 UNITS: 5000 INJECTION INTRAVENOUS; SUBCUTANEOUS at 20:12

## 2025-05-06 RX ADMIN — BRIMONIDINE TARTRATE 1 DROP: 2 SOLUTION OPHTHALMIC at 21:40

## 2025-05-06 RX ADMIN — SODIUM CHLORIDE, PRESERVATIVE FREE 10 ML: 5 INJECTION INTRAVENOUS at 20:12

## 2025-05-06 RX ADMIN — HYDRALAZINE HYDROCHLORIDE 5 MG: 20 INJECTION INTRAMUSCULAR; INTRAVENOUS at 16:09

## 2025-05-06 ASSESSMENT — ENCOUNTER SYMPTOMS
COUGH: 0
NAUSEA: 0
BACK PAIN: 0
EYE PAIN: 0
SORE THROAT: 0
SHORTNESS OF BREATH: 0
ABDOMINAL PAIN: 0
VOMITING: 0

## 2025-05-06 ASSESSMENT — PAIN - FUNCTIONAL ASSESSMENT: PAIN_FUNCTIONAL_ASSESSMENT: NONE - DENIES PAIN

## 2025-05-06 NOTE — ED NOTES
ED TO INPATIENT SBAR HANDOFF    Patient Name: Donna Peoples   Preferred Name: Donna  : 10/17/1930  89 y.o.   Family/Caregiver Present: no   Code Status Order: Prior  PO Status: NPO:No  Telemetry Order: Yes  C-SSRS: Risk of Suicide: No Risk  Sitter no   Restraints:     Sepsis Risk Score      Situation  Chief Complaint   Patient presents with    Facial Droop     NH pt whop whose last known well was 1040 this am.  Family was visiting opt at NH and stated that opt had left sided facial droop at 1045. No obvious facial droop at this time but pot states she is unable to smile.   Pot alert and oriented x3.   Hypertensive in triage     Brief Description of Patient's Condition: pt resting in room comfortably.  Family at beside.  Pt very hard of hearing.  History of vision problems.   Mental Status: oriented, alert, coherent, logical, thought processes intact, and able to concentrate and follow conversation  Arrived from:Home  Imaging:   CT HEAD WO CONTRAST   Final Result   1. No acute intracranial findings.   2. Mild volume loss and chronic microvascular ischemic changes.         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      Moderate stenosis at the origin of the left vertebral artery.      Moderate to severe stenosis in proximal V3 segment of the left vertebral   artery secondary to osteophyte compression.           Abnormal labs:   Abnormal Labs Reviewed   COMPREHENSIVE METABOLIC PANEL - Abnormal; Notable for the following components:       Result Value    Sodium 134 (*)     Chloride 98 (*)     Glucose 128 (*)     Est, Glom Filt Rate 59 (*)     All other components within normal limits   TROPONIN - Abnormal; Notable for the following components:    Troponin, High Sensitivity 24 (*)     All other components within normal limits   URINALYSIS WITH REFLEX TO CULTURE - Abnormal; Notable for the following components:    Clarity, UA CLOUDY (*)     Protein,  (*)

## 2025-05-06 NOTE — PROGRESS NOTES
Medication Reconciliation    List of medications patient is currently taking is complete.     Source of information: 1. Conversation with patient at bedside                                      2. EPIC records                                       3. Medication records from [facility]     Notes regarding home medications:   1. Patient received morning medications prior to arrival to the ER today.    Sloan Horne RPH, PharmD, BCPS  5/6/2025 1:17 PM

## 2025-05-06 NOTE — H&P
Hospital Medicine History & Physical      PCP: John Glasgow    Date of Admission: 5/6/2025    Date of Service: Pt seen/examined on 5/6/25 and Placed in Observation.    Chief Complaint: Transient episode of left facial numbness, slurred speech, blurry vision      History Of Present Illness:    89 y.o. female with a history of hypertension, glaucoma presented to the emergency room Transient episode of left facial numbness, speech difficulty, blurry vision..  Episode lasted less than about 20 minutes..  She is currently back to her baseline     On presentation to the ED, BP was 204/106, pulse 69, respirations 18, temperature 97 point  CT head showed no acute issues, CT head and neck showed -Severe right PCA stenosis, moderate to severe left vertebral stenosis    Past Medical History:          Diagnosis Date    Hypertension        Past Surgical History:          Procedure Laterality Date    FOREARM SURGERY Right 2/27/2022    RADIUS OPEN REDUCTION INTERNAL FIXATION performed by Zuleima Cooper MD at [facility] OR    FRACTURE SURGERY         Medications Prior to Admission:      Prior to Admission medications    Medication Sig Start Date End Date Taking? Authorizing Provider   acetaminophen (TYLENOL) 325 MG tablet Take 2 tablets by mouth every 4 hours as needed for Pain or Fever   Yes Gutierrez Cortez MD   polyethylene glycol (MIRALAX) 17 g PACK packet Take 17 g by mouth daily as needed (constipation)   Yes Gutierrez Cortez MD   carboxymethylcellulose 1 % ophthalmic solution Place 1 drop into both eyes 2 times daily as needed for Dry Eyes   Yes Gutierrez Cortez MD   aspirin 81 MG EC tablet Take 1 tablet by mouth daily 9/26/24  Yes Rivera Jarquin MD   brimonidine-timolol (COMBIGAN) 0.2-0.5 % ophthalmic solution Place 1 drop into both eyes in the morning and 1 drop in the evening.   Yes Gutierrez Cortez MD   bimatoprost (LUMIGAN) 0.01 % SOLN ophthalmic drops Place 1 drop into both eyes nightly   Yes  amlodipine... As needed IV labetalol    DVT Prophylaxis: Subcu heparin  Diet: Diet NPO  Code Status: DNR CC--CODE STATUS verified with patient and family    PT/OT Eval Status: Ordered  Dispo -ECF       Ashwin Gill MD    Thank you John Glasgow for the opportunity to be involved in this patient's care. If you have any questions or concerns please feel free to contact me at [phone].    Comment: Please note this report has been produced using speech recognition software and may contain errors related to that system including errors in grammar, punctuation, and spelling, as well as words and phrases that may be inappropriate. If there are any questions or concerns, please feel free to contact the dictating provider for clarification.

## 2025-05-06 NOTE — ED PROVIDER NOTES
ED Attending Attestation Note    I personally saw the patient and made/approved the management plan and take responsibility for patient management.     Briefly, 89 y.o. female presents with transient left-sided facial droop, now resolved. Had concomitant visual disturbance, poorly characterized, also resolved. Asymptomatic at this time.     Focused exam:   Gen: 89 y.o. female, NAD  HEENT: NCAT  Neuro: NIHSS=0    Imaging:                           CT HEAD WO CONTRAST   Final Result   1. No acute intracranial findings.   2. Mild volume loss and chronic microvascular ischemic changes.         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      Moderate stenosis at the origin of the left vertebral artery.      Moderate to severe stenosis in proximal V3 segment of the left vertebral   artery secondary to osteophyte compression.            EKG interpreted independently by myself.   Rate: normal  Rhythm: NSR  Axis: normal  Intervals: within normal limits  ST Segments: no acute abnormality  T waves: no acute abnormality  Comparison: Compared to 9/23/24, there is resolution of left axis deviation  Impression: NSR with anterior infarct seen on or before 2/26/2022    MDM:   89-year-old female presents with transient left-sided facial droop, now resolved.  Will initiate TIA evaluation.  Anticipate admission to hospital medicine.    For further details of the patient's emergency department visit, please see the advanced practice provider's documentation.    Tez Singh MD     This report has been produced using speech recognition software and may contain errors related to that system including errors in grammar, punctuation, and spelling, as well as words and phrases that may be inappropriate. If there are any questions or concerns please feel free to contact the dictating provider for clarification.       Tez Singh MD  05/07/25 [id number]

## 2025-05-06 NOTE — PROGRESS NOTES
Patient to room 5271 from ED. Patient in bed. Purwick placed and patient on tele. Confirmed with CMU. Admission and assessment initiated. Call light and tray table within reach. Electronically signed by Li Aceves RN on 5/6/2025 at 6:36 PM

## 2025-05-06 NOTE — PROGRESS NOTES
4 Eyes Skin Assessment     NAME:  Donna Peoples  YOB: 1930  MEDICAL RECORD NUMBER:  [id number]    The patient is being assessed for  Admission    I agree that at least one RN has performed a thorough Head to Toe Skin Assessment on the patient. ALL assessment sites listed below have been assessed.      Areas assessed by both nurses:    Head, Face, Ears, Shoulders, Back, Chest, Arms, Elbows, Hands, Sacrum. Buttock, Coccyx, Ischium, Legs. Feet and Heels, and Under Medical Devices         Does the Patient have a Wound? No noted wound(s)       Kingsley Prevention initiated by RN: No  Wound Care Orders initiated by RN: No    Pressure Injury (Stage 3,4, Unstageable, DTI, NWPT, and Complex wounds) if present, place Wound referral order by RN under : No    New Ostomies, if present place, Ostomy referral order under : No     Nurse 1 eSignature: Electronically signed by Li Aceves RN on 5/6/25 at 6:34 PM EDT    **SHARE this note so that the co-signing nurse can place an eSignature**    Nurse 2 eSignature: Electronically signed by Kingsley Jung RN on 5/6/25 at 6:36 PM EDT

## 2025-05-06 NOTE — ED PROVIDER NOTES
[facility] EMERGENCY DEPARTMENT  EMERGENCY DEPARTMENT ENCOUNTER      Pt Name: Donna Peoples  MRN:[id number]  Birthdate 10/17/1930  Date of evaluation: 5/6/2025  Provider: Zeferino Olivo PA-C  Note Started: 3:33 PM EDT 5/6/25        Chief Complaint:    Chief Complaint   Patient presents with    Facial Droop     NH pt whop whose last known well was 1040 this am.  Family was visiting opt at NH and stated that opt had left sided facial droop at 1045. No obvious facial droop at this time but pot states she is unable to smile.   Pot alert and oriented x3.   Hypertensive in triage         Nursing Notes, Past Medical Hx, Past Surgical Hx, Social Hx, Allergies, and Family Hx were all reviewed and agreed with or any disagreements were addressed in the HPI.    HPI: (Location, Duration, Timing, Severity, Quality, Assoc Sx, Context, Modifying factors)    History From: Patient  Limitations to history : None    Social Determinants Significantly Affecting Health : None    Chief Complaint of facial drooping and slurred speech.  According to EMS report and report I got from the nurse to at 1040 the nurse noted patient normal went back and checked out at 10:45 AM this morning she is showing a little facial drooping and slurred speech.  She is in [facility].  So she is brought in by EMS.  When she arrived she appears to be her normal self.  She does not have any history of dementia.  She initially thought she was still at [place] and I told her she was here at [facility] emergency room.  She just said okay.  She has no complaint.  She denies chest pain, no abdominal pain, she denies any visual changes, and no extremity weakness.  She says herself that she does not have any slurred speech that she noticed.  No difficulty with talking.  Denies headache.  No other complaints.    This is a  89 y.o. female who presents to the emergency room with the above complaint    PastMedical/Surgical History:

## 2025-05-07 ENCOUNTER — APPOINTMENT (OUTPATIENT)
Dept: MRI IMAGING | Age: 89
DRG: 069 | End: 2025-05-07
Payer: MEDICARE

## 2025-05-07 ENCOUNTER — APPOINTMENT (OUTPATIENT)
Age: 89
DRG: 069 | End: 2025-05-07
Attending: HOSPITALIST
Payer: MEDICARE

## 2025-05-07 LAB
ANION GAP SERPL CALCULATED.3IONS-SCNC: 10 MMOL/L (ref 3–16)
BUN SERPL-MCNC: 20 MG/DL (ref 7–20)
CALCIUM SERPL-MCNC: 9 MG/DL (ref 8.3–10.6)
CHLORIDE SERPL-SCNC: 98 MMOL/L (ref 99–110)
CHOLEST SERPL-MCNC: 194 MG/DL (ref 0–199)
CO2 SERPL-SCNC: 27 MMOL/L (ref 21–32)
CREAT SERPL-MCNC: 0.8 MG/DL (ref 0.6–1.2)
DEPRECATED RDW RBC AUTO: 13.7 % (ref 12.4–15.4)
ECHO AO ASC DIAM: 3 CM
ECHO AO ASCENDING AORTA INDEX: 2.11 CM/M2
ECHO AO ROOT DIAM: 3.1 CM
ECHO AO ROOT INDEX: 2.18 CM/M2
ECHO AV AREA PEAK VELOCITY: 1.6 CM2
ECHO AV AREA VTI: 1.3 CM2
ECHO AV AREA/BSA PEAK VELOCITY: 1.1 CM2/M2
ECHO AV AREA/BSA VTI: 0.9 CM2/M2
ECHO AV MEAN GRADIENT: 3 MMHG
ECHO AV MEAN VELOCITY: 0.9 M/S
ECHO AV PEAK GRADIENT: 5 MMHG
ECHO AV PEAK VELOCITY: 1.1 M/S
ECHO AV VELOCITY RATIO: 0.82
ECHO AV VTI: 24.3 CM
ECHO BSA: 1.38 M2
ECHO LA AREA 2C: 12.6 CM2
ECHO LA AREA 4C: 13.6 CM2
ECHO LA MAJOR AXIS: 4 CM
ECHO LA MINOR AXIS: 3.8 CM
ECHO LA VOL BP: 36 ML (ref 22–52)
ECHO LA VOL MOD A2C: 35 ML (ref 22–52)
ECHO LA VOL MOD A4C: 35 ML (ref 22–52)
ECHO LA VOL/BSA BIPLANE: 25 ML/M2 (ref 16–34)
ECHO LA VOLUME INDEX MOD A2C: 25 ML/M2 (ref 16–34)
ECHO LA VOLUME INDEX MOD A4C: 25 ML/M2 (ref 16–34)
ECHO LV E' LATERAL VELOCITY: 4.78 CM/S
ECHO LV E' SEPTAL VELOCITY: 4.54 CM/S
ECHO LV EF PHYSICIAN: 70 %
ECHO LV FRACTIONAL SHORTENING: 45 % (ref 28–44)
ECHO LV INTERNAL DIMENSION DIASTOLE INDEX: 2.32 CM/M2
ECHO LV INTERNAL DIMENSION DIASTOLIC: 3.3 CM (ref 3.9–5.3)
ECHO LV INTERNAL DIMENSION SYSTOLIC INDEX: 1.27 CM/M2
ECHO LV INTERNAL DIMENSION SYSTOLIC: 1.8 CM
ECHO LV IVSD: 1.3 CM (ref 0.6–0.9)
ECHO LV MASS 2D: 101.7 G (ref 67–162)
ECHO LV MASS INDEX 2D: 71.6 G/M2 (ref 43–95)
ECHO LV POSTERIOR WALL DIASTOLIC: 0.8 CM (ref 0.6–0.9)
ECHO LV RELATIVE WALL THICKNESS RATIO: 0.48
ECHO LVOT AREA: 2 CM2
ECHO LVOT AV VTI INDEX: 0.64
ECHO LVOT DIAM: 1.6 CM
ECHO LVOT MEAN GRADIENT: 2 MMHG
ECHO LVOT PEAK GRADIENT: 3 MMHG
ECHO LVOT PEAK VELOCITY: 0.9 M/S
ECHO LVOT STROKE VOLUME INDEX: 21.9 ML/M2
ECHO LVOT SV: 31.1 ML
ECHO LVOT VTI: 15.5 CM
ECHO MV A VELOCITY: 1.4 M/S
ECHO MV AREA VTI: 0.9 CM2
ECHO MV E DECELERATION TIME (DT): 310 MS
ECHO MV E VELOCITY: 1.05 M/S
ECHO MV E/A RATIO: 0.75
ECHO MV E/E' LATERAL: 21.97
ECHO MV E/E' RATIO (AVERAGED): 22.55
ECHO MV E/E' SEPTAL: 23.13
ECHO MV LVOT VTI INDEX: 2.33
ECHO MV MAX VELOCITY: 1.4 M/S
ECHO MV MEAN GRADIENT: 4 MMHG
ECHO MV MEAN VELOCITY: 0.9 M/S
ECHO MV PEAK GRADIENT: 8 MMHG
ECHO MV VTI: 36.1 CM
ECHO PV MAX VELOCITY: 1.1 M/S
ECHO PV PEAK GRADIENT: 5 MMHG
ECHO RA AREA 4C: 5.5 CM2
ECHO RA END SYSTOLIC VOLUME APICAL 4 CHAMBER INDEX BSA: 6 ML/M2
ECHO RA VOLUME: 9 ML
ECHO RV BASAL DIMENSION: 3 CM
ECHO RV FREE WALL PEAK S': 13.9 CM/S
ECHO RV MID DIMENSION: 2.2 CM
ECHO RV TAPSE: 2 CM (ref 1.7–?)
EST. AVERAGE GLUCOSE BLD GHB EST-MCNC: 114 MG/DL
GFR SERPLBLD CREATININE-BSD FMLA CKD-EPI: 68 ML/MIN/{1.73_M2}
GLUCOSE SERPL-MCNC: 119 MG/DL (ref 70–99)
HBA1C MFR BLD: 5.6 %
HCT VFR BLD AUTO: 39 % (ref 36–48)
HDLC SERPL-MCNC: 86 MG/DL (ref 40–60)
HGB BLD-MCNC: 13.3 G/DL (ref 12–16)
LDLC SERPL CALC-MCNC: 96 MG/DL
MCH RBC QN AUTO: 30.2 PG (ref 26–34)
MCHC RBC AUTO-ENTMCNC: 34 G/DL (ref 31–36)
MCV RBC AUTO: 88.8 FL (ref 80–100)
PLATELET # BLD AUTO: 237 K/UL (ref 135–450)
PMV BLD AUTO: 7.5 FL (ref 5–10.5)
POTASSIUM SERPL-SCNC: 3.9 MMOL/L (ref 3.5–5.1)
RBC # BLD AUTO: 4.39 M/UL (ref 4–5.2)
SODIUM SERPL-SCNC: 135 MMOL/L (ref 136–145)
TRIGL SERPL-MCNC: 59 MG/DL (ref 0–150)
VLDLC SERPL CALC-MCNC: 12 MG/DL
WBC # BLD AUTO: 4.4 K/UL (ref 4–11)

## 2025-05-07 PROCEDURE — 83036 HEMOGLOBIN GLYCOSYLATED A1C: CPT

## 2025-05-07 PROCEDURE — G0378 HOSPITAL OBSERVATION PER HR: HCPCS

## 2025-05-07 PROCEDURE — 97165 OT EVAL LOW COMPLEX 30 MIN: CPT

## 2025-05-07 PROCEDURE — 93306 TTE W/DOPPLER COMPLETE: CPT | Performed by: INTERNAL MEDICINE

## 2025-05-07 PROCEDURE — 2500000003 HC RX 250 WO HCPCS: Performed by: NURSE PRACTITIONER

## 2025-05-07 PROCEDURE — 93306 TTE W/DOPPLER COMPLETE: CPT

## 2025-05-07 PROCEDURE — 6370000000 HC RX 637 (ALT 250 FOR IP): Performed by: NURSE PRACTITIONER

## 2025-05-07 PROCEDURE — 85027 COMPLETE CBC AUTOMATED: CPT

## 2025-05-07 PROCEDURE — 36415 COLL VENOUS BLD VENIPUNCTURE: CPT

## 2025-05-07 PROCEDURE — 6360000002 HC RX W HCPCS: Performed by: NURSE PRACTITIONER

## 2025-05-07 PROCEDURE — 97161 PT EVAL LOW COMPLEX 20 MIN: CPT

## 2025-05-07 PROCEDURE — 6370000000 HC RX 637 (ALT 250 FOR IP): Performed by: HOSPITALIST

## 2025-05-07 PROCEDURE — 70551 MRI BRAIN STEM W/O DYE: CPT

## 2025-05-07 PROCEDURE — 2500000003 HC RX 250 WO HCPCS: Performed by: HOSPITALIST

## 2025-05-07 PROCEDURE — 97530 THERAPEUTIC ACTIVITIES: CPT

## 2025-05-07 PROCEDURE — 80048 BASIC METABOLIC PNL TOTAL CA: CPT

## 2025-05-07 PROCEDURE — 96372 THER/PROPH/DIAG INJ SC/IM: CPT

## 2025-05-07 PROCEDURE — 80061 LIPID PANEL: CPT

## 2025-05-07 PROCEDURE — 6360000002 HC RX W HCPCS: Performed by: HOSPITALIST

## 2025-05-07 RX ADMIN — ASPIRIN 81 MG: 81 TABLET, CHEWABLE ORAL at 08:47

## 2025-05-07 RX ADMIN — SODIUM CHLORIDE, PRESERVATIVE FREE 10 ML: 5 INJECTION INTRAVENOUS at 08:47

## 2025-05-07 RX ADMIN — SODIUM CHLORIDE, PRESERVATIVE FREE 10 ML: 5 INJECTION INTRAVENOUS at 20:16

## 2025-05-07 RX ADMIN — ZIPRASIDONE MESYLATE 10 MG: 20 INJECTION, POWDER, LYOPHILIZED, FOR SOLUTION INTRAMUSCULAR at 22:43

## 2025-05-07 RX ADMIN — BRIMONIDINE TARTRATE 1 DROP: 2 SOLUTION OPHTHALMIC at 08:48

## 2025-05-07 RX ADMIN — AMLODIPINE BESYLATE 5 MG: 5 TABLET ORAL at 08:47

## 2025-05-07 RX ADMIN — METOPROLOL SUCCINATE 50 MG: 50 TABLET, EXTENDED RELEASE ORAL at 20:16

## 2025-05-07 RX ADMIN — TIMOLOL MALEATE 1 DROP: 5 SOLUTION OPHTHALMIC at 08:48

## 2025-05-07 RX ADMIN — LATANOPROST 1 DROP: 50 SOLUTION OPHTHALMIC at 20:16

## 2025-05-07 RX ADMIN — HEPARIN SODIUM 5000 UNITS: 5000 INJECTION INTRAVENOUS; SUBCUTANEOUS at 20:15

## 2025-05-07 RX ADMIN — HEPARIN SODIUM 5000 UNITS: 5000 INJECTION INTRAVENOUS; SUBCUTANEOUS at 08:48

## 2025-05-07 RX ADMIN — TIMOLOL MALEATE 1 DROP: 5 SOLUTION OPHTHALMIC at 20:16

## 2025-05-07 RX ADMIN — BRIMONIDINE TARTRATE 1 DROP: 2 SOLUTION OPHTHALMIC at 20:16

## 2025-05-07 RX ADMIN — ATORVASTATIN CALCIUM 80 MG: 80 TABLET, FILM COATED ORAL at 20:16

## 2025-05-07 NOTE — PROGRESS NOTES
SLP NOTE    SLP eval/tx order received per stroke r/o protocol.  Patient and daughter met at bedside. SLP role/evaluation objectives discussed; daughter reports resolved concern for stroke and resolved symptoms r/t TIA at this time; daughter does report initiated tx for UTI with concerns for hallucinations at this time.  Needs for SLP eval politely declined.    ST to discontinue evaluation attempts. Please re-consult ST should status change and/or if metabolic abnormalities r/t UTI continue to impact patient's status.    Thank you.  Navya Nice MA, CCC-SLP, #[id number]  Speech-Language Pathologist  Portable phone: [phone]

## 2025-05-07 NOTE — PLAN OF CARE
Problem: Discharge Planning  Goal: Discharge to home or other facility with appropriate resources  5/7/2025 1042 by Maritza Stevens RN  Outcome: Progressing  Flowsheets (Taken 5/7/2025 1005)  Discharge to home or other facility with appropriate resources: Identify barriers to discharge with patient and caregiver  5/6/2025 2222 by Pam Victor RN  Outcome: Progressing     Problem: Safety - Adult  Goal: Free from fall injury  5/7/2025 1042 by Maritza Stevens RN  Outcome: Progressing  5/6/2025 2222 by Pam Victor RN  Outcome: Progressing     Problem: ABCDS Injury Assessment  Goal: Absence of physical injury  5/7/2025 1042 by Maritza Stevens RN  Outcome: Progressing  5/6/2025 2222 by Pam Victor RN  Outcome: Progressing

## 2025-05-07 NOTE — PROGRESS NOTES
No acute events over night. The first time I assessed the pts NIHSS she scored a zero. The second NIH assessment I scored her a 2 for dysarthria and ataxia of the RUE- the pt was drowsy, she was following commands but needed more explanation and was delayed to respond. She is pretty [place] and I did wake her up to do my assessment so I'm not sure if she was still half asleep- the third NIH score was back down to zero.     Pt only slept about 2 hours. At about midnight she started to become restless and was pulling at lines/ wires. She pulled out her IV and pulled her tele off multiple times- she remained A&Ox4 and was frustrated with herself that she kept getting \"tangled.\"     Sammi MEDLEY messaged at the start of my shift about the pts UA appearing to be positive- she added in Rocephin q24.     NSR on tele. All fall precautions in place.     Electronically signed by Valente Orozco RN on 5/7/2025 at 6:12 AM

## 2025-05-07 NOTE — PROGRESS NOTES
Name:  Donna Peoples /Age/Sex: 10/17/1930  (89 y.o. female)   MRN & CSN:  [id number] & [id number] Encounter Date/Time: 2025 1:11 PM EDT   Location:  [id number] PCP: John Glasgow     Attending:Laurie Dalton MD       Donna Peoples is a 89 y.o. female with  has a past medical history of Hypertension. who presents with TIA (transient ischemic attack)    Hospital Day: 2      Assessment and Plan     TIA     Patient with episode of acute episode of left facial droop and and slurred speech   CT head negative   CTA head and neck with severe stenosis of the right PCA   MRI brain ordered   Echocardiogram ordered   Continue aspirin and statin   Monitor on telemetry    UTI   As seen on urinalysis   Started on empiric IV ceftriaxone   Urine culture in process    Essential hypertension   Continue home metoprolol and started on amlodipine    Generalized weakness   PT OT evaluation    Objective:       Intake/Output Summary (Last 24 hours) at 2025 1311  Last data filed at 2025 1100  Gross per 24 hour   Intake 480 ml   Output --   Net 480 ml      Vitals:   Vitals:    25 0512 25 0830 25 1123 25 1244   BP:  (!) 177/91 132/86 132/86   Pulse:   73    Resp:   14    Temp:  98.3 °F (36.8 °C) 97.5 °F (36.4 °C)    TempSrc:  Oral Oral    SpO2:   100%    Weight: 41.8 kg (92 lb 2.4 oz)   41.7 kg (92 lb)   Height:    1.651 m (5' 5\")         Interval history:     Seen and examined at bedside. No acute events overnight.  Discussed with patient and daughter, patient reports feeling back to baseline.  Denies any active problems with speech or facial numbness.    Physical Exam:        General: NAD  Eyes: EOMI  ENT: neck supple  Cardiovascular: Regular rate.  Respiratory: Clear to auscultation  Gastrointestinal: Soft, non tender  Genitourinary: no suprapubic tenderness  Musculoskeletal: No edema  Neuro: Alert and oriented, no focal deficits  Psych: Mood appropriate.     Review of Systems:      10

## 2025-05-07 NOTE — CARE COORDINATION
Case Management Assessment  Initial Evaluation    Date/Time of Evaluation: 5/7/2025 4:52 PM  Assessment Completed by: MAGED Contreras, SAVANNAHW    If patient is discharged prior to next notation, then this note serves as note for discharge by case management.    Patient Name: Donna Peoples                   YOB: 1930  Diagnosis: TIA (transient ischemic attack) [G45.9]                   Date / Time: 5/6/2025 11:25 AM    Patient Admission Status: Observation   Readmission Risk (Low < 19, Mod (19-27), High > 27): Readmission Risk Score: 13.6    Current PCP: John Glasgow  PCP verified by CM? Yes    Chart Reviewed: Yes      History Provided by: Patient  Patient Orientation: Alert and Oriented    Patient Cognition: Alert    Hospitalization in the last 30 days (Readmission):  No    If yes, Readmission Assessment in  Navigator will be completed.    Advance Directives:      Code Status: DNR-CC   Patient's Primary Decision Maker is: Legal Next of Kin    Primary Decision Maker (Active): Whit Neff - Child - [phone]    Discharge Planning:    Patient lives with: Alone Type of Home: Assisted living  Primary Care Giver: Self  Patient Support Systems include: Children, Family Members   Current Financial resources: Medicare  Current community resources: None  Current services prior to admission: Other (Comment) (Assisted living.)            Current DME:              Type of Home Care services:  None    ADLS  Prior functional level: Independent in ADLs/IADLs  Current functional level: Assistance with the following:, Cooking    PT AM-PAC: 18 /24  OT AM-PAC: 21 /24    Family can provide assistance at DC: Yes  Would you like Case Management to discuss the discharge plan with any other family members/significant others, and if so, who? Yes (children.)  Plans to Return to Present Housing: Yes  Other Identified Issues/Barriers to RETURNING to current housing: None noted at this time.   Potential Assistance needed

## 2025-05-07 NOTE — ACP (ADVANCE CARE PLANNING)
Advance Care Planning   Healthcare Decision Maker:    Primary Decision Maker (Active): Whit Neff - Child - [phone]    Respectfully submitted,    Anju LYNNE, [facility]  [phone]    Electronically signed by MAGED Contreras, LSW on 5/7/2025 at 4:52 PM

## 2025-05-07 NOTE — PROGRESS NOTES
[facility] - Occupational Therapy   Phone: [phone]    Occupational Therapy  Facility/Department:[address] PROGRESSIVE CARE    [x] Initial Evaluation            [] Daily Treatment Note         [] Discharge Summary      Patient: Donna Peoples   : 10/17/1930   MRN: [id number]   Date of Service:  2025    Staff Mobility Recommendation: RW x1    AM-PAC Score: 21/24  Discharge Recommendations: return home ([facility]) with assist PRN    Donna Peoples scored a 21/24 on the AM-PAC ADL Inpatient form. Current research shows that an AM-PAC score of 18 or greater is typically associated with a discharge to the patient's home setting.   Please see assessment section for further patient specific details.    If patient discharges prior to next session this note will serve as a discharge summary.  Please see below for the latest assessment towards goals.     Admitting Diagnosis:  TIA (transient ischemic attack)  Ordering Physician: Ashwin Gill MD   Current Admission Summary: Pt is a 89 yo F presenting with facial droop and slurred speech. CT head showed no acute issues, CT head and neck showed - Severe right PCA stenosis, moderate to severe left vertebral stenosis.     Past Medical History:  has a past medical history of Hypertension.  Past Surgical History:  has a past surgical history that includes fracture surgery and Forearm surgery (Right, 2022).    Assessment  Activity Tolerance: Good  Impairments Requiring Therapeutic Intervention: decreased functional mobility, decreased ADL status, decreased cognition, decreased endurance, decreased balance, decreased IADL  Prognosis: good    Clinical Assessment: PTA - pt lives in assisted living at [facility] ADLs and mobility/txs with walker. Assist with IADLs. Today - pt completed fxl txs SBA and amb with RW household distances in room/hallway SBA, steady gait, no LOB. Grooming SBA. LB dressing SBA. BUE strength/coordination WFL. 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)  Vision Exceptions: Wears glasses for reading  Hearing:   Hearing: Exceptions to WFL  Hearing Exceptions: Hard of hearing/hearing concerns, No hearing aid  Posture:   Good  Sensation:   Pt reporting numbness fingertips yamil (chronic)  Coordination Testing:   WFL  Finger/Thumb Opposition: WFL    ROM:   (B) UE AROM WFL  (L) Shoulder: achieves near full range     (R) Shoulder: ~100* AROM  (L) Elbow: WFL     (R) Elbow: WFL  Strength:   (L) Shoulder: 4     (R) Shoulder: -3  (L) Elbow: 4     (R) Elbow: 4  (L)  Strength: WFL    (R)  Strength: WFL    Therapist Clinical Decision Making (Complexity): low complexity         Activities of Daily Living  Basic Activities of Daily Living  Grooming: stand by assistance  Grooming Comments: pt rinsed dentures in stance sinkside SBA, politely declining additional grooming tasks at this time.   Lower Extremity Dressing: stand by assistance  Dressing Comments: doffed/donned footies yamil while seated in recliner, SBA  Instrumental Activities of Daily Living  No IADL completed on this date.    Functional Mobility  Bed Mobility:  Bed mobility not completed on this date.  Comments: seated in recliner at start/end of session  Transfers:  Sit to stand transfer:stand by assistance  Stand to sit transfer: stand by assistance  Comments: RW. Pt tx <> recliner SBA  Functional Mobility  Functional Mobility Activity: household distances in room/hallway, ~200'  Device Use: rolling walker  Required Assistance: stand by assistance  Comment: Pt amb with RW household distances in room/hallway SBA, steady gait, no LOB.    Balance:  Static Sitting Balance: good: independent with functional balance in unsupported position  Dynamic Sitting Balance: fair (+): maintains balance at SBA/supervision without use of UE support  Static Standing Balance: fair (+): maintains balance at SBA/supervision without use of UE support  Dynamic Standing Balance: fair (-): maintains

## 2025-05-07 NOTE — PROGRESS NOTES
[facility] - Physical Therapy   Phone: [phone]    Physical Therapy  Facility/Department:[address] PROGRESSIVE CARE    [x] Initial Evaluation            [x] Daily Treatment Note         [] Discharge Summary      Patient: Donna Peoples   : 10/17/1930   MRN: [id number]   Date of Service:  2025  Staff Mobility Recommendation: RW x 1    AM-PAC score:   Discharge Recommendations: Return to [facility] with HHPT    Admitting Diagnosis: TIA (transient ischemic attack)  Ordering Physician: Ashwin Gill MD on 25  Current Admission Summary: \"Chief Complaint of facial drooping and slurred speech.  According to EMS report and report I got from the nurse to at 1040 the nurse noted patient normal went back and checked out at 10:45 AM this morning she is showing a little facial drooping and slurred speech.  She is in [facility].  So she is brought in by EMS.  When she arrived she appears to be her normal self.  She does not have any history of dementia.  She initially thought she was still at [place] and I told her she was here at [facility] emergency room.  She just said okay.  She has no complaint.  She denies chest pain, no abdominal pain, she denies any visual changes, and no extremity weakness.  She says herself that she does not have any slurred speech that she noticed.  No difficulty with talking.  Denies headache.  No other complaints.\"     Past Medical History:  has a past medical history of Hypertension.  Past Surgical History:  has a past surgical history that includes fracture surgery and Forearm surgery (Right, 2022).    Assessment  Activity Tolerance: Good  Impairments Requiring Therapeutic Intervention: decreased functional mobility  Prognosis: good    Clinical Assessment: Pt demonstrated functional LE strength.  No facial droop noted this morning.  She feels back to her baseline for mobility.  She was able to complete mobility and self-care tasks in room with RW support,  CGA-SBA.  Anticipate return to [facility] with HHPT.  Recommend continued therapy to maximize independence with functional mobility tasks.        DME Required For Discharge: no DME required at discharge        Restrictions:  Precautions/Restrictions: no restrictions  Weight Bearing Restrictions: no restrictions    Required Braces/Orthotics: no braces required  Positional Restrictions:no positional restrictions    Subjective  General: Pt reports her facial droop has resolved.  Feels good.    Family/Caregiver present: No  Pain: 0/10  Pain Interventions: not applicable    Home Environment / Functional History  Lives With: Alone  Type of Home: Assisted living  Home Layout: One level  Home Access: Level entry  Bathroom Shower/Tub: Walk-in shower  Bathroom Toilet: Standard  Bathroom Equipment: Shower chair, Grab bars around toilet, Grab bars in shower  Home Equipment: Walker - Rolling  Has the patient had two or more falls in the past year or any fall with injury in the past year?: No  Prior Level of Assist for ADLs:  (Pt is independent with toileting)  Prior Level of Assist for Ambulation: Independent household ambulator, with or without device (RW)  Prior Level of Assist for Transfers: Independent    Available Assistance at Discharge: available PRN    Examination   Vision/Hearing  Vision  Vision: Impaired (C/o poor vision)  Hearing  Hearing: Within functional limits    Observation:   General Observation:  No facial droop noted this morning.   Posture:   Forward head  Forward shoulders  Kyphotic  Sensation:   WFL  Coordination Testing:   WFL    ROM:   (B) LE AROM WFL  Strength:   (B) LE strength grossly WFL  Therapist Clinical Decision Making (Complexity): low complexity  Clinical Presentation: stable      Functional Mobility  Bed Mobility:  Supine to Sit: stand by assistance  Rolling Left: stand by assistance    Transfers:  Sit to stand transfer: stand by assistance  Stand to sit transfer: stand by

## 2025-05-07 NOTE — PROGRESS NOTES
I attempted to see patient for neurology consultation but off unit for testing.   Will reattempt later today.   Please call with any immediate needs.     Garima Cordoba, [facility]  Neurology  [phone]

## 2025-05-07 NOTE — CONSULTS
Neurology Consult Note  Reason for Consult: stroke like symptoms    Chief complaint: \"I had an event yesterday\"    Laurie Walton MD asked me to see Donna Peoples in consultation for evaluation of stroke like symptoms     History of Present Illness:  I obtained my information via the patient, supplemented with chart review.     Donna Peoples is a 89 y.o. female with hx of HTN. She was admitted on 5/6/25 after presenting to the ED with new onset facial droop and slurred speech. Symptoms were noted less than 1 hour prior to arrival. She was talking to her daughter when she developed sudden garbled, slurred speech that was severe and difficult to understand her nursing facility noted her left face was drooping. Patient was aware a little bit of speech symptoms but not to the extent of everyone else she says. Denies any headache, vision changes, known limb weakness, sensory symptoms, chest pain, shortness of breath. No reported swallowing difficulty.  By the time she assessed in the ED her symptoms essentially were resolved. NIHSS 0. She was hypertensive on arrival, 204/116 per daughter is very atypical for her.. There were concerns for TIA.     MRI brain completed prior to encounter and negative for acute ischemic stroke     She continues to be at her baseline per daughter and patient. Nothing like this has happened before.     Prior to admission medications include: 81mg ASA. She is not on statin therapy.         Medical History:  Past Medical History:   Diagnosis Date    Hypertension      Past Surgical History:   Procedure Laterality Date    FOREARM SURGERY Right 2/27/2022    RADIUS OPEN REDUCTION INTERNAL FIXATION performed by Zuleima Cooper MD at [facility] OR    FRACTURE SURGERY       Scheduled Meds:   sodium chloride flush  5-40 mL IntraVENous 2 times per day    heparin (porcine)  5,000 Units SubCUTAneous BID    metoprolol succinate  50 mg Oral Nightly    latanoprost  1 drop Both Eyes Nightly    amLODIPine  5 mg  20  Creatinine: 0.8  Glucose: 119  Calcium: 9.0    HbA1c: 5.6  LDL: 96    ALT: 12  AST: 26    WBC: 4.4  RBC: 4.39  Hgb: 13.3  Hct: 39.0  Platelet: 237    UA: large leukocyte esterase. Neg for nitrites. 207 WBC  4+ bacteria.  Urine culture: in process.     Studies  MRI Brain w/o 5/7/25: Independently reviewed.   IMPRESSION:  1. No acute infarct or other acute intracranial process.  2. Severe chronic microvascular ischemic changes.  3. 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      CT Head w/o 5/6/25: Independently reviewed.   IMPRESSION:  1. No acute intracranial findings.  2. Mild volume loss and chronic microvascular ischemic changes.      CTA Head & Neck w/ 5/6/25:  IMPRESSION:  Severe stenosis in the P3 segment of the right PCA.  Moderate stenosis at the origin of the left vertebral artery.  Moderate to severe stenosis in proximal V3 segment of the left vertebral  artery secondary to osteophyte compression.    EKG 5/6/25: NSR    Impression  1. TIA: Acute resolved dysarthria, garbled speech and reported left facial asymmetry. MRI brain negative for acute stroke . ABCD 2 score 3  2. HTN  3. Severe Right PCA stenosis.   4. Vertebral Artery stenosis. Incidental rate at left vertebral artery at origin.   5. Abnormal UA/Pyuria/bacteruria: possible UTI. Culture pending         Recommendations  - TTE pending per primary team.   - Continue ASA for now. Considering DAPT x 90 days vs 325mg ASA monotherapy. Discussed with daughter and patient today. Defer to neurology attending.   - Can continue on statin therapy. LDL goal < 70. Side effects discussed with patient and daughter. Discussed advanced age, risk of side effects. Long term risks maybe outweighed by risk of side effects.  LDL goal <70.   - Slowly trend BP towards normotension. Long term goal < 140/90. Avoid hypotension and acute fluctuations as able  - HbA1c goal < 7  - Telemetry monitoring. Please notify neurology of any atrial  ambulated to bathroom

## 2025-05-08 LAB
BACTERIA UR CULT: ABNORMAL
ORGANISM: ABNORMAL

## 2025-05-08 PROCEDURE — 2500000003 HC RX 250 WO HCPCS: Performed by: NURSE PRACTITIONER

## 2025-05-08 PROCEDURE — 94760 N-INVAS EAR/PLS OXIMETRY 1: CPT

## 2025-05-08 PROCEDURE — 96376 TX/PRO/DX INJ SAME DRUG ADON: CPT

## 2025-05-08 PROCEDURE — 2500000003 HC RX 250 WO HCPCS: Performed by: HOSPITALIST

## 2025-05-08 PROCEDURE — 6360000002 HC RX W HCPCS: Performed by: NURSE PRACTITIONER

## 2025-05-08 PROCEDURE — 6370000000 HC RX 637 (ALT 250 FOR IP): Performed by: HOSPITALIST

## 2025-05-08 PROCEDURE — 1200000000 HC SEMI PRIVATE

## 2025-05-08 PROCEDURE — 2580000003 HC RX 258

## 2025-05-08 PROCEDURE — 6360000002 HC RX W HCPCS: Performed by: HOSPITALIST

## 2025-05-08 PROCEDURE — 6370000000 HC RX 637 (ALT 250 FOR IP)

## 2025-05-08 PROCEDURE — 6360000002 HC RX W HCPCS

## 2025-05-08 RX ORDER — LISINOPRIL 5 MG/1
5 TABLET ORAL DAILY
Status: DISCONTINUED | OUTPATIENT
Start: 2025-05-08 | End: 2025-05-10 | Stop reason: HOSPADM

## 2025-05-08 RX ORDER — LABETALOL HYDROCHLORIDE 5 MG/ML
10 INJECTION, SOLUTION INTRAVENOUS ONCE
Status: COMPLETED | OUTPATIENT
Start: 2025-05-08 | End: 2025-05-08

## 2025-05-08 RX ORDER — SENNOSIDES 8.6 MG
325 CAPSULE ORAL DAILY
Status: DISCONTINUED | OUTPATIENT
Start: 2025-05-09 | End: 2025-05-10 | Stop reason: HOSPADM

## 2025-05-08 RX ORDER — AMLODIPINE BESYLATE 10 MG/1
10 TABLET ORAL DAILY
Status: DISCONTINUED | OUTPATIENT
Start: 2025-05-09 | End: 2025-05-10 | Stop reason: HOSPADM

## 2025-05-08 RX ORDER — SODIUM CHLORIDE 9 MG/ML
INJECTION, SOLUTION INTRAVENOUS CONTINUOUS
Status: DISCONTINUED | OUTPATIENT
Start: 2025-05-08 | End: 2025-05-09

## 2025-05-08 RX ORDER — LORAZEPAM 2 MG/ML
0.5 INJECTION INTRAMUSCULAR EVERY 6 HOURS PRN
Status: DISCONTINUED | OUTPATIENT
Start: 2025-05-08 | End: 2025-05-10 | Stop reason: HOSPADM

## 2025-05-08 RX ADMIN — ASPIRIN 81 MG: 81 TABLET, CHEWABLE ORAL at 09:55

## 2025-05-08 RX ADMIN — BRIMONIDINE TARTRATE 1 DROP: 2 SOLUTION OPHTHALMIC at 22:48

## 2025-05-08 RX ADMIN — HEPARIN SODIUM 5000 UNITS: 5000 INJECTION INTRAVENOUS; SUBCUTANEOUS at 21:48

## 2025-05-08 RX ADMIN — LORAZEPAM 0.5 MG: 2 INJECTION INTRAMUSCULAR; INTRAVENOUS at 10:23

## 2025-05-08 RX ADMIN — LISINOPRIL 5 MG: 5 TABLET ORAL at 16:05

## 2025-05-08 RX ADMIN — WATER 1000 MG: 1 INJECTION INTRAMUSCULAR; INTRAVENOUS; SUBCUTANEOUS at 23:58

## 2025-05-08 RX ADMIN — ATORVASTATIN CALCIUM 80 MG: 80 TABLET, FILM COATED ORAL at 21:48

## 2025-05-08 RX ADMIN — AMLODIPINE BESYLATE 5 MG: 5 TABLET ORAL at 12:04

## 2025-05-08 RX ADMIN — TIMOLOL MALEATE 1 DROP: 5 SOLUTION OPHTHALMIC at 09:56

## 2025-05-08 RX ADMIN — LATANOPROST 1 DROP: 50 SOLUTION OPHTHALMIC at 22:48

## 2025-05-08 RX ADMIN — SODIUM CHLORIDE, PRESERVATIVE FREE 10 ML: 5 INJECTION INTRAVENOUS at 09:55

## 2025-05-08 RX ADMIN — WATER 1000 MG: 1 INJECTION INTRAMUSCULAR; INTRAVENOUS; SUBCUTANEOUS at 00:06

## 2025-05-08 RX ADMIN — METOPROLOL SUCCINATE 50 MG: 50 TABLET, EXTENDED RELEASE ORAL at 21:48

## 2025-05-08 RX ADMIN — LABETALOL HYDROCHLORIDE 10 MG: 5 INJECTION, SOLUTION INTRAVENOUS at 12:21

## 2025-05-08 RX ADMIN — AMLODIPINE BESYLATE 5 MG: 5 TABLET ORAL at 09:55

## 2025-05-08 RX ADMIN — ASPIRIN 81 MG: 81 TABLET, CHEWABLE ORAL at 12:04

## 2025-05-08 RX ADMIN — HEPARIN SODIUM 5000 UNITS: 5000 INJECTION INTRAVENOUS; SUBCUTANEOUS at 09:55

## 2025-05-08 RX ADMIN — TIMOLOL MALEATE 1 DROP: 5 SOLUTION OPHTHALMIC at 22:48

## 2025-05-08 RX ADMIN — BRIMONIDINE TARTRATE 1 DROP: 2 SOLUTION OPHTHALMIC at 09:56

## 2025-05-08 RX ADMIN — LABETALOL HYDROCHLORIDE 10 MG: 5 INJECTION, SOLUTION INTRAVENOUS at 04:26

## 2025-05-08 RX ADMIN — SODIUM CHLORIDE: 0.9 INJECTION, SOLUTION INTRAVENOUS at 12:24

## 2025-05-08 NOTE — PLAN OF CARE
Problem: Safety - Adult  Goal: Free from fall injury  Outcome: Progressing  Flowsheets (Taken 5/8/2025 0120)  Free From Fall Injury: Instruct family/caregiver on patient safety     Problem: ABCDS Injury Assessment  Goal: Absence of physical injury  Outcome: Progressing  Flowsheets (Taken 5/8/2025 0120)  Absence of Physical Injury: Implement safety measures based on patient assessment     Problem: Neurosensory - Adult  Goal: Achieves stable or improved neurological status  Outcome: Progressing  Flowsheets (Taken 5/8/2025 0120)  Achieves stable or improved neurological status: Assess for and report changes in neurological status     Problem: Infection - Adult  Goal: Absence of infection at discharge  Outcome: Progressing  Flowsheets (Taken 5/8/2025 0120)  Absence of infection at discharge:   Assess and monitor for signs and symptoms of infection   Monitor lab/diagnostic results     Problem: Discharge Planning  Goal: Discharge to home or other facility with appropriate resources  Flowsheets (Taken 5/8/2025 0120)  Discharge to home or other facility with appropriate resources: Identify barriers to discharge with patient and caregiver

## 2025-05-08 NOTE — PROGRESS NOTES
Patient was alert and oriented x2 on initial assessment, taking po medications without issue but when this nurse entered room around 2120, patient was disoriented x4 and continually attempting to climb out of bed, not redirectable, agitated with hallucinations present and aggressiveness when staff attempt to change wet brief.  Covering physician notified with concerns and request for seroquel.  Melatonin ordered.  RN attempted to give and patient refused.  RN attempted crushed with applesauce and patient spit it out. RN and PCA at bedside attempting to redirect patient to keep her from falling out of bed, patient again became physically aggressive and was hitting and scratching staff. Provider notified, Geodon given as ordered.  Patient continued to attempt getting out of bed and continues to become aggressive with hitting, scratching and attempting to bite when RN attempts to put legs back in bed, get vitals or give IV antibiotics. Provider notified that medication not effective. Bed alarm on high sensitivity and frequent staff rounding in room.   BP elevated, patient aggressive during vitals which may be contributing, prn labetalol given as ordered which was effective.

## 2025-05-08 NOTE — FLOWSHEET NOTE
Message sent to Dr. Dalton notifying him of patient's blood pressure. New orders received.   Electronically signed by Laila Christensen RN on 5/8/2025 at 3:45 PM    05/08/25 1540   Vitals   Temp 97.3 °F (36.3 °C)   Temp Source Axillary   Pulse 72   Heart Rate Source Monitor   Respirations 14   BP (!) 186/103   MAP (Calculated) 131   MAP (mmHg) 126   BP Location Left leg   Patient Position Supine   Oxygen Therapy   SpO2 97 %   O2 Device None (Room air)

## 2025-05-08 NOTE — PROGRESS NOTES
Name:  Donna Peoples /Age/Sex: 10/17/1930  (89 y.o. female)   MRN & CSN:  [id number] & [id number] Encounter Date/Time: 2025 1:11 PM EDT   Location:  D2C-5197/5271-01 PCP: John Glasgow     Attending:Laurie Dalton MD       Donna Peoples is a 89 y.o. female with  has a past medical history of Hypertension. who presents with TIA (transient ischemic attack)    Hospital Day: 3      Assessment and Plan     TIA     Patient with episode of acute episode of left facial droop and and slurred speech   CT head negative   CTA head and neck with severe stenosis of the right PCA   MRI brain negative   Echocardiogram with preserved ejection fraction   Neurology recommended to increase aspirin increased to 325 and signed off   Monitor on telemetry    UTI   As seen on urinalysis   Started on empiric IV ceftriaxone   Urine culture positive E. coli    Agitation   Due to above   Started on Zyprexa nightly as needed and Ativan as needed    Essential hypertension   Continue home metoprolol and started on amlodipine   Labetalol PRN    Generalized weakness   PT OT evaluation    Objective:       Intake/Output Summary (Last 24 hours) at 2025 1335  Last data filed at 2025 1000  Gross per 24 hour   Intake 120 ml   Output --   Net 120 ml      Vitals:   Vitals:    25 0430 25 0457 25 0855 25 1143   BP:  (!) 163/80 (!) 171/118 (!) 175/101   Pulse: 75 74 83 74   Resp:   17 19   Temp:    97.7 °F (36.5 °C)   TempSrc:    Oral   SpO2:   90% 94%   Weight:       Height:             Interval history:     Seen and examined at bedside. Patient extremely agitated and restless overnight and in the morning. Ordered ativan, discussed with family member at bedside    Physical Exam:        General: In distress  Eyes: EOMI  ENT: neck supple  Cardiovascular: Regular rate.  Respiratory: Clear to auscultation  Gastrointestinal: Soft, non tender  Genitourinary: no suprapubic tenderness  Musculoskeletal: No edema  Neuro:

## 2025-05-08 NOTE — PLAN OF CARE
Problem: Discharge Planning  Goal: Discharge to home or other facility with appropriate resources  Outcome: Progressing     Problem: Safety - Adult  Goal: Free from fall injury  Outcome: Progressing     Problem: ABCDS Injury Assessment  Goal: Absence of physical injury  Outcome: Progressing     Problem: Neurosensory - Adult  Goal: Achieves stable or improved neurological status  Outcome: Progressing     Problem: Infection - Adult  Goal: Absence of infection at discharge  Outcome: Progressing     Problem: Skin/Tissue Integrity  Goal: Skin integrity remains intact  Description: 1.  Monitor for areas of redness and/or skin breakdown2.  Assess vascular access sites hourly3.  Every 4-6 hours minimum:  Change oxygen saturation probe site4.  Every 4-6 hours:  If on nasal continuous positive airway pressure, respiratory therapy assess nares and determine need for appliance change or resting period  Outcome: Progressing

## 2025-05-08 NOTE — PROGRESS NOTES
Update given to patient's daughter, all questions answered.   Electronically signed by Laila Christensen RN on 5/8/2025 at 4:14 PM

## 2025-05-09 PROCEDURE — 94760 N-INVAS EAR/PLS OXIMETRY 1: CPT

## 2025-05-09 PROCEDURE — 2500000003 HC RX 250 WO HCPCS: Performed by: HOSPITALIST

## 2025-05-09 PROCEDURE — 6370000000 HC RX 637 (ALT 250 FOR IP): Performed by: HOSPITALIST

## 2025-05-09 PROCEDURE — 97530 THERAPEUTIC ACTIVITIES: CPT

## 2025-05-09 PROCEDURE — 6370000000 HC RX 637 (ALT 250 FOR IP)

## 2025-05-09 PROCEDURE — 2580000003 HC RX 258

## 2025-05-09 PROCEDURE — 6360000002 HC RX W HCPCS: Performed by: NURSE PRACTITIONER

## 2025-05-09 PROCEDURE — 1200000000 HC SEMI PRIVATE

## 2025-05-09 PROCEDURE — 97535 SELF CARE MNGMENT TRAINING: CPT

## 2025-05-09 PROCEDURE — 6360000002 HC RX W HCPCS: Performed by: HOSPITALIST

## 2025-05-09 PROCEDURE — 2500000003 HC RX 250 WO HCPCS: Performed by: NURSE PRACTITIONER

## 2025-05-09 RX ADMIN — ASPIRIN 325 MG: 325 TABLET, COATED ORAL at 08:53

## 2025-05-09 RX ADMIN — LISINOPRIL 5 MG: 5 TABLET ORAL at 08:53

## 2025-05-09 RX ADMIN — ATORVASTATIN CALCIUM 80 MG: 80 TABLET, FILM COATED ORAL at 22:01

## 2025-05-09 RX ADMIN — LATANOPROST 1 DROP: 50 SOLUTION OPHTHALMIC at 22:02

## 2025-05-09 RX ADMIN — AMLODIPINE BESYLATE 10 MG: 10 TABLET ORAL at 08:53

## 2025-05-09 RX ADMIN — TIMOLOL MALEATE 1 DROP: 5 SOLUTION OPHTHALMIC at 22:02

## 2025-05-09 RX ADMIN — SODIUM CHLORIDE, PRESERVATIVE FREE 10 ML: 5 INJECTION INTRAVENOUS at 22:06

## 2025-05-09 RX ADMIN — HEPARIN SODIUM 5000 UNITS: 5000 INJECTION INTRAVENOUS; SUBCUTANEOUS at 08:53

## 2025-05-09 RX ADMIN — WATER 1000 MG: 1 INJECTION INTRAMUSCULAR; INTRAVENOUS; SUBCUTANEOUS at 23:37

## 2025-05-09 RX ADMIN — TIMOLOL MALEATE 1 DROP: 5 SOLUTION OPHTHALMIC at 08:53

## 2025-05-09 RX ADMIN — BRIMONIDINE TARTRATE 1 DROP: 2 SOLUTION OPHTHALMIC at 22:02

## 2025-05-09 RX ADMIN — SODIUM CHLORIDE: 0.9 INJECTION, SOLUTION INTRAVENOUS at 09:03

## 2025-05-09 RX ADMIN — METOPROLOL SUCCINATE 50 MG: 50 TABLET, EXTENDED RELEASE ORAL at 22:01

## 2025-05-09 RX ADMIN — HEPARIN SODIUM 5000 UNITS: 5000 INJECTION INTRAVENOUS; SUBCUTANEOUS at 22:01

## 2025-05-09 RX ADMIN — BRIMONIDINE TARTRATE 1 DROP: 2 SOLUTION OPHTHALMIC at 08:53

## 2025-05-09 NOTE — PROGRESS NOTES
[facility] - Physical Therapy   Phone: [phone]    Physical Therapy  Facility/Department:[address] PROGRESSIVE CARE    [] Initial Evaluation            [x] Daily Treatment Note         [] Discharge Summary      Patient: Donna Peoples   : 10/17/1930   MRN: [id number]   Date of Service:  2025  Staff Mobility Recommendation: RW x 1    AM-PAC score: 16/24  Discharge Recommendations: SNF    Donna Peoples scored a 16/24 on the AM-PAC short mobility form. Current research shows that an AM-PAC score of 17 or less is typically not associated with a discharge to the patient's home setting. Based on the patient's AM-PAC score and their current functional mobility deficits, it is recommended that the patient have 3-5 sessions per week of Physical Therapy at d/c to increase the patient's independence.  Please see assessment section for further patient specific details.    If patient discharges prior to next session this note will serve as a discharge summary.  Please see below for the latest assessment towards goals.      Admitting Diagnosis: TIA (transient ischemic attack)  Ordering Physician: Ashwin Gill MD on 25  Current Admission Summary: \"Chief Complaint of facial drooping and slurred speech.  According to EMS report and report I got from the nurse to at 1040 the nurse noted patient normal went back and checked out at 10:45 AM this morning she is showing a little facial drooping and slurred speech.  She is in [facility].  So she is brought in by EMS.  When she arrived she appears to be her normal self.  She does not have any history of dementia.  She initially thought she was still at [place] and I told her she was here at [facility] emergency room.  She just said okay.  She has no complaint.  She denies chest pain, no abdominal pain, she denies any visual changes, and no extremity weakness.  She says herself that she does not have any slurred speech that she noticed.  No  with laundry/cooking breakfast and lunch. 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  Leisure & Hobbies: Reading, puzzles.    Available Assistance at Discharge: available PRN    Examination   Vision/Hearing  Vision  Vision: Impaired (C/o poor vision)  Hearing  Hearing: Within functional limits    Observation:   General Observation:  No facial droop noted this morning.   Posture:   Forward head  Forward shoulders  Kyphotic  Sensation:   WFL  Coordination Testing:   WFL    ROM:   (B) LE AROM WFL  Strength:   (B) LE strength grossly WFL  Therapist Clinical Decision Making (Complexity): low complexity  Clinical Presentation: stable      Functional Mobility  Bed Mobility:  Supine to Sit: moderate assistance  Sit to Supine: n/a, in recliner at end of session    Transfers:  Sit to stand transfer: moderate assistance, maximum assistance  Stand to sit transfer: moderate assistance    Ambulation:  Surface:level surface  Assistive Device: rolling walker  Assistance: stand by assistance, contact guard assistance  Distance: 10' + 25'  Gait Mechanics: Slow omar  Shuffles  Comments: No LOB.  Balance:  Static Sitting Balance: good: independent with functional balance in unsupported position  Dynamic Sitting Balance: good: independent with functional balance in unsupported position  Static Standing Balance: fair (-): maintains balance at CGA with use of UE support  Dynamic Standing Balance: fair (-): maintains balance at CGA with use of UE support    Exercise:   No exercises performed this session.       Cognition  WFL  Orientation:    alert and oriented x 4    Education  Barriers To Learning: hearing  Patient Education: patient educated

## 2025-05-09 NOTE — PROGRESS NOTES
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  Leisure & Hobbies: Reading, puzzles.    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)- [place]  Vision Exceptions: Wears glasses for reading  Hearing:   Hearing: Exceptions to WFL  Hearing Exceptions: Hard of hearing/hearing concerns, No hearing aid  Posture:   Good  Sensation:   Pt reporting numbness fingertips yamil (chronic)  Coordination Testing:   WFL  Finger/Thumb Opposition: WFL    ROM:   (B) UE AROM WFL  (L) Shoulder: achieves near full range     (R) Shoulder: ~100* AROM  (L) Elbow: WFL     (R) Elbow: WFL  Strength:   (L) Shoulder: 4     (R) Shoulder: -3  (L) Elbow: 4     (R) Elbow: 4  (L)  Strength: WFL    (R)  Strength: WFL    Therapist Clinical Decision Making (Complexity): low complexity         Activities of Daily Living  Basic Activities of Daily Living  Grooming: minimal assistance Increased time to complete task Standing sink side hand washing- post lean, assist for balance  Toileting: maximum assistance requires verbal cueing Increased time to complete task Pt urinates seated on toilet. Assist for balance, clothing management and partial pericare (pt attempt pericare in stance).    Instrumental Activities of Daily Living  No IADL completed on this date.    Functional Mobility  Bed Mobility:  Bed mobility not completed on this date.  Comments: seated in recliner at start/end of session  Transfers:  Sit to stand transfer:minimal assistance, moderate assistance  Stand to sit transfer: minimal assistance, moderate assistance  Comments: RW. At recliner and toilet. Cues hand placement and use of bar on toilet  Functional Mobility  Functional Mobility Activity: recliner><toilet  (20 ft x2)  Device Use: rolling walker  Required Assistance: minimal assistance  Comment: Min unsteadiness, intermittent post lean and near LOB. Low vision     Balance:  Static Sitting Balance: fair (+): maintains balance at SBA/supervision without use of UE support  Dynamic Sitting Balance: fair (-): maintains balance at CGA with use of UE support  Static Standing Balance: fair (-): maintains balance at CGA with use of UE support  Dynamic Standing Balance: poor (+): requires min (A) to maintain balance      Cognition  Overall Cognitive Status: Impaired  Arousal/Alertness: appropriate responses to stimuli  Following Commands: follows one step commands consistently  Attention Span: appears intact  Memory: decreased short term memory  Problem Solving: decreased awareness of errors, assistance required to identify errors made  Orientation:    oriented to person, oriented to place, oriented to time, and oriented to situation     Education  Barriers To Learning: cognition and hearing  Patient Education: patient educated on goals, OT role and benefits, plan of care, ADL adaptive strategies, transfer training, discharge recommendations  Learning Assessment:  patient verbalizes understanding, would benefit from continued reinforcement  Safety Interventions: patient at risk for falls, call light within reach, patient left in chair, chair alarm in place, and nurse notified    Plan  Frequency: 3-5 x/week  Current Treatment Recommendations: strengthening, balance training, functional mobility training, transfer training, endurance training, patient/caregiver education, ADL/self-care training, IADL training, home exercise program, and safety education    Goals  Patient Goals: return to [facility]   Short Term Goals:  Time Frame: By acute discharge  Patient will complete lower body dressing with modified independent   Patient will complete upper body dressing with modified independent  Patient will complete toileting with

## 2025-05-09 NOTE — CARE COORDINATION
Discharge Planning:    This RN CM spoke with PHILLY Hines at [facility], re: patient's discharge plan. The most recent therapy notes were reviewed and Flora confirmed that the facility would be able to meet the patient's needs.    Discharge plan at this time remains for patient to return to [facility] and engage in therapy provided by facility therapy staff. CM to continue to follow for updates.     Call placed to patient's daughter, Whit, to confirm these details. Whit requested that she call me back in a few minutes; awaiting return call at this time.    Electronically signed by ZINA ARANDA RN on 5/9/2025 at 2:20 PM

## 2025-05-09 NOTE — PLAN OF CARE
Problem: Safety - Adult  Goal: Free from fall injury  5/9/2025 0529 by Amber Jones RN  Outcome: Progressing  Flowsheets (Taken 5/9/2025 0529)  Free From Fall Injury: Instruct family/caregiver on patient safety     Problem: ABCDS Injury Assessment  Goal: Absence of physical injury  5/9/2025 0529 by Amber Jones RN  Outcome: Progressing  Flowsheets (Taken 5/9/2025 0529)  Absence of Physical Injury: Implement safety measures based on patient assessment     Problem: Neurosensory - Adult  Goal: Achieves stable or improved neurological status  5/9/2025 0529 by Amber Jones RN  Outcome: Progressing  Flowsheets (Taken 5/9/2025 0529)  Achieves stable or improved neurological status: Assess for and report changes in neurological status     Problem: Infection - Adult  Goal: Absence of infection at discharge  5/9/2025 0529 by Amber Jones RN  Outcome: Progressing  Flowsheets (Taken 5/9/2025 0529)  Absence of infection at discharge:   Monitor lab/diagnostic results   Assess and monitor for signs and symptoms of infection     Problem: Skin/Tissue Integrity  Goal: Skin integrity remains intact  Description: 1.  Monitor for areas of redness and/or skin breakdown2.  Assess vascular access sites hourly3.  Every 4-6 hours minimum:  Change oxygen saturation probe site4.  Every 4-6 hours:  If on nasal continuous positive airway pressure, respiratory therapy assess nares and determine need for appliance change or resting period  5/9/2025 0529 by Amber Jones RN  Outcome: Progressing  Flowsheets (Taken 5/9/2025 0529)  Skin Integrity Remains Intact: Monitor for areas of redness and/or skin breakdown

## 2025-05-09 NOTE — PROGRESS NOTES
Name:  Donna Peoples /Age/Sex: 10/17/1930  (89 y.o. female)   MRN & CSN:  [id number] & [id number] Encounter Date/Time: 2025 1:11 PM EDT   Location:  [id number] PCP: John Glasgow     Attending:Laurie Dalton MD       Donna Peoples is a 89 y.o. female with  has a past medical history of Hypertension. 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 coli    Agitation   Mentation back to baseline   Started on Zyprexa nightly as needed and Ativan as needed    Essential hypertension   Blood pressure well-controlled with home metoprolol and started on amlodipine while inpatient   Labetalol PRN    Generalized weakness   PT OT evaluation    Objective:       Intake/Output Summary (Last 24 hours) at 2025 1248  Last data filed at 2025 0602  Gross per 24 hour   Intake 1831.64 ml   Output --   Net 1831.64 ml      Vitals:   Vitals:    25 0027 25 0512 25 0724 25 1139   BP: (!) 164/65 137/73 139/85 135/85   Pulse: 56 74 69 63   Resp: 18 18 11 16   Temp: 97.5 °F (36.4 °C) 97.3 °F (36.3 °C) 97.9 °F (36.6 °C) 97.3 °F (36.3 °C)   TempSrc: Axillary Axillary Oral Oral   SpO2: 94% 99% 100% 99%   Weight:  41.1 kg (90 lb 9.7 oz)     Height:             Interval history:     Seen and examined at bedside.  No acute events overnight, discussed with the patient and family members at bedside.  Patient denies any active chest pain or shortness of breath, reports feeling better than before    Physical Exam:        General: No

## 2025-05-09 NOTE — PLAN OF CARE
Problem: Discharge Planning  Goal: Discharge to home or other facility with appropriate resources  5/9/2025 0753 by Laila Christensen RN  Outcome: Progressing  5/9/2025 0529 by Amber Jones RN  Outcome: Progressing  Flowsheets (Taken 5/9/2025 0529)  Discharge to home or other facility with appropriate resources: Identify barriers to discharge with patient and caregiver  5/8/2025 1836 by Laila Christensen RN  Outcome: Progressing     Problem: Safety - Adult  Goal: Free from fall injury  5/9/2025 0753 by Laila Christensen RN  Outcome: Progressing  5/9/2025 0529 by Amber Jones RN  Outcome: Progressing  Flowsheets (Taken 5/9/2025 0529)  Free From Fall Injury: Instruct family/caregiver on patient safety  5/8/2025 1836 by Laila Christensen RN  Outcome: Progressing     Problem: ABCDS Injury Assessment  Goal: Absence of physical injury  5/9/2025 0753 by Laila Christensen RN  Outcome: Progressing  5/9/2025 0529 by Amber Jones RN  Outcome: Progressing  Flowsheets (Taken 5/9/2025 0529)  Absence of Physical Injury: Implement safety measures based on patient assessment  5/8/2025 1836 by Laila Christensen RN  Outcome: Progressing     Problem: Neurosensory - Adult  Goal: Achieves stable or improved neurological status  5/9/2025 0753 by Laila Christensen RN  Outcome: Progressing  5/9/2025 0529 by Amber Jones RN  Outcome: Progressing  Flowsheets (Taken 5/9/2025 0529)  Achieves stable or improved neurological status: Assess for and report changes in neurological status  5/8/2025 1836 by Laila Christensen RN  Outcome: Progressing     Problem: Infection - Adult  Goal: Absence of infection at discharge  5/9/2025 0753 by Laila Christensen RN  Outcome: Progressing  5/9/2025 0529 by Amber Jones RN  Outcome: Progressing  Flowsheets (Taken 5/9/2025 0529)  Absence of infection at discharge:   Monitor lab/diagnostic results   Assess and monitor for signs and symptoms of infection  5/8/2025 1836 by Laila Christensen RN  Outcome: Progressing     Problem:  Skin/Tissue Integrity  Goal: Skin integrity remains intact  Description: 1.  Monitor for areas of redness and/or skin breakdown2.  Assess vascular access sites hourly3.  Every 4-6 hours minimum:  Change oxygen saturation probe site4.  Every 4-6 hours:  If on nasal continuous positive airway pressure, respiratory therapy assess nares and determine need for appliance change or resting period  5/9/2025 0753 by Laila Christensen, RN  Outcome: Progressing  5/9/2025 0529 by Amber Jones, RN  Outcome: Progressing  Flowsheets (Taken 5/9/2025 0529)  Skin Integrity Remains Intact: Monitor for areas of redness and/or skin breakdown  5/8/2025 1836 by Laila Christensen, RN  Outcome: Progressing

## 2025-05-10 VITALS
RESPIRATION RATE: 17 BRPM | BODY MASS INDEX: 15.72 KG/M2 | DIASTOLIC BLOOD PRESSURE: 86 MMHG | HEIGHT: 65 IN | SYSTOLIC BLOOD PRESSURE: 167 MMHG | WEIGHT: 94.36 LBS | TEMPERATURE: 99 F | HEART RATE: 74 BPM | OXYGEN SATURATION: 98 %

## 2025-05-10 LAB
ANION GAP SERPL CALCULATED.3IONS-SCNC: 10 MMOL/L (ref 3–16)
BASOPHILS # BLD: 0 K/UL (ref 0–0.2)
BASOPHILS NFR BLD: 0.6 %
BUN SERPL-MCNC: 29 MG/DL (ref 7–20)
CALCIUM SERPL-MCNC: 9.3 MG/DL (ref 8.3–10.6)
CHLORIDE SERPL-SCNC: 101 MMOL/L (ref 99–110)
CO2 SERPL-SCNC: 25 MMOL/L (ref 21–32)
CREAT SERPL-MCNC: 0.7 MG/DL (ref 0.6–1.2)
DEPRECATED RDW RBC AUTO: 13.8 % (ref 12.4–15.4)
EOSINOPHIL # BLD: 0.1 K/UL (ref 0–0.6)
EOSINOPHIL NFR BLD: 1.6 %
GFR SERPLBLD CREATININE-BSD FMLA CKD-EPI: 80 ML/MIN/{1.73_M2}
GLUCOSE SERPL-MCNC: 117 MG/DL (ref 70–99)
HCT VFR BLD AUTO: 44.9 % (ref 36–48)
HGB BLD-MCNC: 14.9 G/DL (ref 12–16)
LYMPHOCYTES # BLD: 1.2 K/UL (ref 1–5.1)
LYMPHOCYTES NFR BLD: 19 %
MCH RBC QN AUTO: 29.9 PG (ref 26–34)
MCHC RBC AUTO-ENTMCNC: 33.3 G/DL (ref 31–36)
MCV RBC AUTO: 89.9 FL (ref 80–100)
MONOCYTES # BLD: 0.4 K/UL (ref 0–1.3)
MONOCYTES NFR BLD: 6.6 %
NEUTROPHILS # BLD: 4.6 K/UL (ref 1.7–7.7)
NEUTROPHILS NFR BLD: 72.2 %
PLATELET # BLD AUTO: 260 K/UL (ref 135–450)
PMV BLD AUTO: 7.6 FL (ref 5–10.5)
POTASSIUM SERPL-SCNC: 4.1 MMOL/L (ref 3.5–5.1)
RBC # BLD AUTO: 4.99 M/UL (ref 4–5.2)
SODIUM SERPL-SCNC: 136 MMOL/L (ref 136–145)
WBC # BLD AUTO: 6.3 K/UL (ref 4–11)

## 2025-05-10 PROCEDURE — 94760 N-INVAS EAR/PLS OXIMETRY 1: CPT

## 2025-05-10 PROCEDURE — 6360000002 HC RX W HCPCS: Performed by: HOSPITALIST

## 2025-05-10 PROCEDURE — 2500000003 HC RX 250 WO HCPCS: Performed by: HOSPITALIST

## 2025-05-10 PROCEDURE — 36415 COLL VENOUS BLD VENIPUNCTURE: CPT

## 2025-05-10 PROCEDURE — 80048 BASIC METABOLIC PNL TOTAL CA: CPT

## 2025-05-10 PROCEDURE — 6370000000 HC RX 637 (ALT 250 FOR IP)

## 2025-05-10 PROCEDURE — 85025 COMPLETE CBC W/AUTO DIFF WBC: CPT

## 2025-05-10 RX ORDER — LISINOPRIL 5 MG/1
5 TABLET ORAL DAILY
Qty: 30 TABLET | Refills: 0 | Status: SHIPPED | OUTPATIENT
Start: 2025-05-10

## 2025-05-10 RX ORDER — AMLODIPINE BESYLATE 10 MG/1
10 TABLET ORAL DAILY
Qty: 30 TABLET | Refills: 0 | Status: SHIPPED | OUTPATIENT
Start: 2025-05-10

## 2025-05-10 RX ORDER — ATORVASTATIN CALCIUM 80 MG/1
80 TABLET, FILM COATED ORAL NIGHTLY
Qty: 30 TABLET | Refills: 0 | Status: SHIPPED | OUTPATIENT
Start: 2025-05-10

## 2025-05-10 RX ORDER — SENNOSIDES 8.6 MG
325 CAPSULE ORAL DAILY
Qty: 30 TABLET | Refills: 0 | Status: SHIPPED | OUTPATIENT
Start: 2025-05-10

## 2025-05-10 RX ORDER — NITROFURANTOIN 25; 75 MG/1; MG/1
100 CAPSULE ORAL 2 TIMES DAILY
Qty: 10 CAPSULE | Refills: 0 | Status: SHIPPED | OUTPATIENT
Start: 2025-05-10 | End: 2025-05-15

## 2025-05-10 RX ADMIN — ASPIRIN 325 MG: 325 TABLET, COATED ORAL at 11:10

## 2025-05-10 RX ADMIN — LISINOPRIL 5 MG: 5 TABLET ORAL at 11:09

## 2025-05-10 RX ADMIN — HEPARIN SODIUM 5000 UNITS: 5000 INJECTION INTRAVENOUS; SUBCUTANEOUS at 11:10

## 2025-05-10 RX ADMIN — SODIUM CHLORIDE, PRESERVATIVE FREE 10 ML: 5 INJECTION INTRAVENOUS at 11:10

## 2025-05-10 RX ADMIN — TIMOLOL MALEATE 1 DROP: 5 SOLUTION OPHTHALMIC at 11:11

## 2025-05-10 RX ADMIN — AMLODIPINE BESYLATE 10 MG: 10 TABLET ORAL at 11:10

## 2025-05-10 RX ADMIN — BRIMONIDINE TARTRATE 1 DROP: 2 SOLUTION OPHTHALMIC at 11:11

## 2025-05-10 NOTE — PLAN OF CARE
Problem: Discharge Planning  Goal: Discharge to home or other facility with appropriate resources  5/10/2025 1359 by Danika Gasca RN  Outcome: Completed  Flowsheets (Taken 5/10/2025 1155)  Discharge to home or other facility with appropriate resources:   Identify barriers to discharge with patient and caregiver   Arrange for needed discharge resources and transportation as appropriate   Identify discharge learning needs (meds, wound care, etc)     Problem: Safety - Adult  Goal: Free from fall injury  5/10/2025 1359 by Danika Gasca RN  Outcome: Completed     Problem: ABCDS Injury Assessment  Goal: Absence of physical injury  5/10/2025 1359 by Danika Gasca RN  Outcome: Completed     Problem: Neurosensory - Adult  Goal: Achieves stable or improved neurological status  5/10/2025 1359 by Danika Gasca RN  Outcome: Completed  Flowsheets (Taken 5/10/2025 1155)  Achieves stable or improved neurological status: Assess for and report changes in neurological status     Problem: Infection - Adult  Goal: Absence of infection at discharge  5/10/2025 1359 by Danika Gasca RN  Outcome: Completed  Flowsheets (Taken 5/10/2025 1155)  Absence of infection at discharge:   Assess and monitor for signs and symptoms of infection   Monitor lab/diagnostic results   Monitor all insertion sites i.e., indwelling lines, tubes and drains     Problem: Skin/Tissue Integrity  Goal: Skin integrity remains intact  Description: 1.  Monitor for areas of redness and/or skin breakdown2.  Assess vascular access sites hourly3.  Every 4-6 hours minimum:  Change oxygen saturation probe site4.  Every 4-6 hours:  If on nasal continuous positive airway pressure, respiratory therapy assess nares and determine need for appliance change or resting period  5/10/2025 1359 by Danika Gasca, RN  Outcome: Completed  Flowsheets (Taken 5/10/2025 1155)  Skin Integrity Remains Intact: Monitor for areas of redness and/or skin breakdown     Problem: Respiratory -

## 2025-05-10 NOTE — CARE COORDINATION
Case Management Discharge Note          Date / Time of Note: 5/10/2025 1:07 PM                  Patient Name: Donna Peoples   YOB: 1930  Diagnosis: TIA (transient ischemic attack) [G45.9]   Date / Time: 5/6/2025 11:25 AM    Financial:  Payor: MEDICARE / Plan: MEDICARE PART A AND B / Product Type: *No Product type* /      Pharmacy:    [facility] - [facility] [address] [phone] - F [phone]  [address]  [facility] [place] [address]  Phone: [phone] Fax: [phone]      Assistance purchasing medications?: Potential Assistance Purchasing Medications: No  Assistance provided by Case Management: None at this time    DISCHARGE Disposition: Assisted Living Facility: [facility] Phone: [phone] Fax: [phone]    Transportation:  Transportation PLAN for discharge: EMS transportation   Mode of Transport: Ambulance stretcher - BLS  Reason for medical transport: Bed confined: Meets the following criteria 1) unable to get out of bed without assistance or ambulate, 2) unable to safely sit up in a wheelchair, 3) unable to maintain erect seating position in a chair for time needed for transport  Name of Transport Company:  [facility]   Phone: [phone]  Time of Transport: 2:30    Transport form completed: Yes    IMM Completed:   Not Indicated    Additional CM Notes: LSW spoke with patient's sister, Whit at bedside regarding discharge planning and transportation. LSW explained that stretcher transportation may not be covered by insurance, Whit expressed understanding and is aware that patient my be responsible for cost.     LSW attempted to contact [facility] DON (Flora) and left two voicemails at nursing station. LSW was able to contact  with admissions who states that patient can return. AVS faxed to [phone] per facility request.     The Plan for Transition of Care is related to the following treatment goals of TIA

## 2025-05-10 NOTE — DISCHARGE INSTR - COC
Continuity of Care Form    Patient Name: Donna Alexander   :  10/17/1930  MRN:  [id number]    Admit date:  2025  Discharge date:  5/10/2025    Code Status Order: DNR-CC   Advance Directives:     Admitting Physician:  Laurie Dalton MD  PCP: John Glasgow    Discharging Nurse: Danika Gasca RN  Discharging Hospital Unit/Room#: J6B-1781/5271-01  Discharging Unit Phone Number: [phone]    Emergency Contact:   Extended Emergency Contact Information  Primary Emergency Contact: Whit Neff           [place], [place] [address] [place] [address]  Home Phone: [phone]  Mobile Phone: [phone]  Relation: Child  Preferred language: English   needed? No  Secondary Emergency Contact: VALERIA ALEXANDER  Home Phone: [phone]  Relation: Child  Preferred language: English   needed? No    Past Surgical History:  Past Surgical History:   Procedure Laterality Date    FOREARM SURGERY Right 2022    RADIUS OPEN REDUCTION INTERNAL FIXATION performed by Zuleima Cooper MD at [facility] OR    FRACTURE SURGERY         Immunization History:   Immunization History   Administered Date(s) Administered    COVID-19, PFIZER GRAY top, DO NOT Dilute, (age 12 y+), IM, 30 mcg/0.3 mL 03/15/2022    COVID-19, PFIZER PURPLE top, DILUTE for use, (age 12 y+), 30mcg/0.3mL 2021       Active Problems:  Patient Active Problem List   Diagnosis Code    Wrist fracture S62.109A    Uncontrolled hypertension I10    Wrist fracture, closed, right, initial encounter S62.101A    Open wrist fracture, right, initial encounter S62.101B    Type I or II open Galeazzi's fracture of radius S52.379B    Acute metabolic encephalopathy G93.41    TIA (transient ischemic attack) G45.9       Isolation/Infection:   Isolation            No Isolation          Patient Infection Status    None to display         Nurse Assessment:  Last Vital Signs: BP (!) 149/82   Pulse 78   Temp 97.9 °F (36.6 °C) (Oral)   Resp 16   Ht 1.651 m (5' 5\")   Wt

## 2025-05-10 NOTE — PLAN OF CARE
Problem: Discharge Planning  Goal: Discharge to home or other facility with appropriate resources  Outcome: Progressing     Problem: Safety - Adult  Goal: Free from fall injury  Outcome: Progressing     Problem: ABCDS Injury Assessment  Goal: Absence of physical injury  Outcome: Progressing     Problem: Neurosensory - Adult  Goal: Achieves stable or improved neurological status  Outcome: Progressing     Problem: Infection - Adult  Goal: Absence of infection at discharge  Outcome: Progressing     Problem: Skin/Tissue Integrity  Goal: Skin integrity remains intact  Outcome: Progressing

## 2025-05-10 NOTE — PROGRESS NOTES
Discharge order noted. Peripheral IV and heart monitor removed. Verbal and typed discharge education provided to patient and patient's daughter. All verbalized understanding. Report given to EMS Transport. All questions answered. Report to be called to receiving personnel at [facility].     Patient was transported off of Unit via stretcher by EMS Transport to return to [facility]. Electronically signed by Danika Gasca RN on 5/10/2025 at 2:58 PM

## 2025-05-10 NOTE — DISCHARGE SUMMARY
Name:  Donna Peoples /Age/Sex: 10/17/1930 (89 y.o. female)   Admit Date: 2025  Discharge Date:     MRN & CSN:  [id number] & [id number] Encounter Date :     Attending:  Laurie Dalton MD Discharging Provider: Laurie Datlon MD     Hospital Course:      Donna Peoples is a 89 y.o. female who presented with     Chief Complaint   Patient presents with    Facial Droop     [facility] pt who whose last known well was 1040 this am.  Family was visiting opt at NH and stated that opt had left sided facial droop at 1045. No obvious facial droop at this time but pot states she is unable to smile.   Pot alert and oriented x3.   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 coli was discharged on nitrofurantoin     Agitation   Due to above              Mentation back to baseline with treatment with IV antibiotics     Essential hypertension              Blood pressure was later well-controlled with home metoprolol, started on amlodipine 10 Mg and lisinopril 5 Mg    On the basis of discharge, patient reported feeling stable. The patient was found to not be in any acute distress. Further, the patient expressed appropriate understanding of, and agreement with, the discharge recommendations, medications and plan.     Follow up appointments :  Primary care physician: John Glasgow within 1 week, neurology in 1 week    Consults this admission:  IP  PHUR 7.5 05/06/2025 04:28 PM    PHUR 6.5 02/26/2022 07:30 PM    WBCUA 207 05/06/2025 04:28 PM    RBCUA 1 05/06/2025 04:28 PM    BACTERIA 4+ 05/06/2025 04:28 PM    CLARITYU CLOUDY 05/06/2025 04:28 PM    LEUKOCYTESUR LARGE 05/06/2025 04:28 PM    UROBILINOGEN 1.0 05/06/2025 04:28 PM    BILIRUBINUR Negative 05/06/2025 04:28 PM    BLOODU Negative 05/06/2025 04:28 PM    GLUCOSEU Negative 05/06/2025 04:28 PM    KETUA Negative 05/06/2025 04:28 PM     Urine Cultures:   Lab Results   Component Value Date/Time    LABURIN >100,000 CFU/ml 05/06/2025 04:28 PM     Blood Cultures:   Lab Results   Component Value Date/Time    BC No Growth after 4 days of incubation. 09/24/2024 02:50 AM     Lab Results   Component Value Date/Time    BLOODCULT2 No Growth after 4 days of incubation. 09/24/2024 03:39 AM     Organism:   Lab Results   Component Value Date/Time    ORG Escherichia coli 05/06/2025 04:28 PM       Time Spent on Discharge:  36 minutes were spent in patient examination, evaluation, counseling as well as medication reconciliation, prescriptions for required medications, discharge plan and follow up    Laurie Dalton MD

## 2025-05-23 NOTE — PROGRESS NOTES
Physician Progress Note      PATIENT:               YIMI ALEXANDER  CSN #:                  [id number]  :                       10/17/1930  ADMIT DATE:       2025 11:25 AM  DISCH DATE:        5/10/2025 2:59 PM  RESPONDING  PROVIDER #:        Laurie Dalton MD          QUERY TEXT:    Patient presented with left facial droop and slurred speech and was diagnosed   with a TIA. CTA shows severe stenosis right PCA (posterior cerebral artery)   and moderate to severe stenosis left vertebral artery. After study, can the   suspected etiology of the TIA be further specified as:    The clinical indicators include:  - Neuro : \"Patient had transient dysarthria and left facial droop. MRI did   not show acute abnormality. Concern for TIA.  Recommend increasing ASA to 325   mg.  At patient's age, not convinced statin would necessarily have greater   benefit then risk.  Recommend to follow up with neurology in 1 month. Severe   Right PCA stenosis. Vertebral Artery stenosis. Incidental rate at left   vertebral artery at origin.\"  Options provided:  -- TIA possibly related to right PCA stenosis and left vertebral artery   stenosis without infarction  -- Other - I will add my own diagnosis  -- Disagree - Not applicable / Not valid  -- Disagree - Clinically unable to determine / Unknown  -- Refer to Clinical Documentation Reviewer    PROVIDER RESPONSE TEXT:    Patient with TIA possibly related to right PCA stenosis and left vertebral   artery stenosis without infarction.    Query created by: Nahed Feliciano on 2025 7:31 AM      Electronically signed by:  Laurie Dalton MD 2025 1:04 PM

## 2025-07-03 ENCOUNTER — APPOINTMENT (OUTPATIENT)
Dept: CT IMAGING | Age: 89
End: 2025-07-03
Payer: MEDICARE

## 2025-07-03 ENCOUNTER — HOSPITAL ENCOUNTER (EMERGENCY)
Age: 89
Discharge: HOME | End: 2025-07-03
Attending: EMERGENCY MEDICINE
Payer: MEDICARE

## 2025-07-03 ENCOUNTER — APPOINTMENT (OUTPATIENT)
Dept: GENERAL RADIOLOGY | Age: 89
End: 2025-07-03
Payer: MEDICARE

## 2025-07-03 VITALS
TEMPERATURE: 97.7 F | OXYGEN SATURATION: 98 % | DIASTOLIC BLOOD PRESSURE: 106 MMHG | BODY MASS INDEX: 15.7 KG/M2 | SYSTOLIC BLOOD PRESSURE: 210 MMHG | RESPIRATION RATE: 15 BRPM | HEART RATE: 59 BPM | HEIGHT: 65 IN

## 2025-07-03 DIAGNOSIS — Z71.89 GOALS OF CARE, COUNSELING/DISCUSSION: ICD-10-CM

## 2025-07-03 DIAGNOSIS — R73.9 HYPERGLYCEMIA: ICD-10-CM

## 2025-07-03 DIAGNOSIS — N30.01 ACUTE CYSTITIS WITH HEMATURIA: Primary | ICD-10-CM

## 2025-07-03 LAB
ALBUMIN SERPL-MCNC: 3.3 G/DL (ref 3.4–5)
ALBUMIN/GLOB SERPL: 1.2 {RATIO} (ref 1.1–2.2)
ALP SERPL-CCNC: 112 U/L (ref 40–129)
ALT SERPL-CCNC: 29 U/L (ref 10–40)
ANION GAP SERPL CALCULATED.3IONS-SCNC: 10 MMOL/L (ref 3–16)
AST SERPL-CCNC: 34 U/L (ref 15–37)
BACTERIA URNS QL MICRO: ABNORMAL /HPF
BASOPHILS # BLD: 0 K/UL (ref 0–0.2)
BASOPHILS NFR BLD: 0.8 %
BILIRUB SERPL-MCNC: 0.4 MG/DL (ref 0–1)
BILIRUB UR QL STRIP.AUTO: NEGATIVE
BUN SERPL-MCNC: 22 MG/DL (ref 7–20)
CALCIUM SERPL-MCNC: 9.1 MG/DL (ref 8.3–10.6)
CHLORIDE SERPL-SCNC: 101 MMOL/L (ref 99–110)
CHP ED QC CHECK: NORMAL
CLARITY UR: ABNORMAL
CO2 SERPL-SCNC: 27 MMOL/L (ref 21–32)
COLOR UR: YELLOW
CREAT SERPL-MCNC: 1 MG/DL (ref 0.6–1.2)
DEPRECATED RDW RBC AUTO: 13.9 % (ref 12.4–15.4)
EKG ATRIAL RATE: 59 BPM
EKG DIAGNOSIS: NORMAL
EKG P AXIS: 85 DEGREES
EKG P-R INTERVAL: 174 MS
EKG Q-T INTERVAL: 428 MS
EKG QRS DURATION: 74 MS
EKG QTC CALCULATION (BAZETT): 423 MS
EKG R AXIS: 84 DEGREES
EKG T AXIS: 84 DEGREES
EKG VENTRICULAR RATE: 59 BPM
EOSINOPHIL # BLD: 0.5 K/UL (ref 0–0.6)
EOSINOPHIL NFR BLD: 9.6 %
EPI CELLS #/AREA URNS AUTO: 6 /HPF (ref 0–5)
GFR SERPLBLD CREATININE-BSD FMLA CKD-EPI: 52 ML/MIN/{1.73_M2}
GLUCOSE BLD-MCNC: 208 MG/DL
GLUCOSE BLD-MCNC: 208 MG/DL (ref 70–99)
GLUCOSE SERPL-MCNC: 173 MG/DL (ref 70–99)
GLUCOSE UR STRIP.AUTO-MCNC: NEGATIVE MG/DL
HCT VFR BLD AUTO: 37.8 % (ref 36–48)
HGB BLD-MCNC: 12.7 G/DL (ref 12–16)
HGB UR QL STRIP.AUTO: ABNORMAL
HYALINE CASTS #/AREA URNS AUTO: 2 /LPF (ref 0–8)
KETONES UR STRIP.AUTO-MCNC: ABNORMAL MG/DL
LEUKOCYTE ESTERASE UR QL STRIP.AUTO: ABNORMAL
LYMPHOCYTES # BLD: 1.4 K/UL (ref 1–5.1)
LYMPHOCYTES NFR BLD: 27.5 %
MCH RBC QN AUTO: 30 PG (ref 26–34)
MCHC RBC AUTO-ENTMCNC: 33.6 G/DL (ref 31–36)
MCV RBC AUTO: 89.4 FL (ref 80–100)
MONOCYTES # BLD: 0.3 K/UL (ref 0–1.3)
MONOCYTES NFR BLD: 6.1 %
NEUTROPHILS # BLD: 2.9 K/UL (ref 1.7–7.7)
NEUTROPHILS NFR BLD: 56 %
NITRITE UR QL STRIP.AUTO: POSITIVE
PERFORMED ON: ABNORMAL
PH UR STRIP.AUTO: 5.5 [PH] (ref 5–8)
PLATELET # BLD AUTO: 133 K/UL (ref 135–450)
PMV BLD AUTO: 7.7 FL (ref 5–10.5)
POTASSIUM SERPL-SCNC: 4.2 MMOL/L (ref 3.5–5.1)
PROT SERPL-MCNC: 6 G/DL (ref 6.4–8.2)
PROT UR STRIP.AUTO-MCNC: 100 MG/DL
RBC # BLD AUTO: 4.23 M/UL (ref 4–5.2)
RBC CLUMPS #/AREA URNS AUTO: 2 /HPF (ref 0–4)
SODIUM SERPL-SCNC: 138 MMOL/L (ref 136–145)
SP GR UR STRIP.AUTO: 1.02 (ref 1–1.03)
UA COMPLETE W REFLEX CULTURE PNL UR: YES
UA DIPSTICK W REFLEX MICRO PNL UR: YES
URN SPEC COLLECT METH UR: ABNORMAL
UROBILINOGEN UR STRIP-ACNC: 1 E.U./DL
WBC # BLD AUTO: 5.2 K/UL (ref 4–11)
WBC #/AREA URNS AUTO: 3934 /HPF (ref 0–5)

## 2025-07-03 PROCEDURE — 93010 ELECTROCARDIOGRAM REPORT: CPT | Performed by: INTERNAL MEDICINE

## 2025-07-03 PROCEDURE — 71045 X-RAY EXAM CHEST 1 VIEW: CPT

## 2025-07-03 PROCEDURE — 87086 URINE CULTURE/COLONY COUNT: CPT

## 2025-07-03 PROCEDURE — 85025 COMPLETE CBC W/AUTO DIFF WBC: CPT

## 2025-07-03 PROCEDURE — 87186 SC STD MICRODIL/AGAR DIL: CPT

## 2025-07-03 PROCEDURE — 6370000000 HC RX 637 (ALT 250 FOR IP): Performed by: EMERGENCY MEDICINE

## 2025-07-03 PROCEDURE — 93005 ELECTROCARDIOGRAM TRACING: CPT | Performed by: EMERGENCY MEDICINE

## 2025-07-03 PROCEDURE — 99285 EMERGENCY DEPT VISIT HI MDM: CPT

## 2025-07-03 PROCEDURE — 81001 URINALYSIS AUTO W/SCOPE: CPT

## 2025-07-03 PROCEDURE — 36415 COLL VENOUS BLD VENIPUNCTURE: CPT

## 2025-07-03 PROCEDURE — 70450 CT HEAD/BRAIN W/O DYE: CPT

## 2025-07-03 PROCEDURE — 80053 COMPREHEN METABOLIC PANEL: CPT

## 2025-07-03 PROCEDURE — 87077 CULTURE AEROBIC IDENTIFY: CPT

## 2025-07-03 RX ORDER — CEFUROXIME AXETIL 250 MG/1
250 TABLET ORAL 2 TIMES DAILY
Qty: 14 TABLET | Refills: 0 | Status: SHIPPED | OUTPATIENT
Start: 2025-07-03 | End: 2025-07-10

## 2025-07-03 RX ORDER — CEFUROXIME AXETIL 250 MG/1
250 TABLET ORAL ONCE
Status: COMPLETED | OUTPATIENT
Start: 2025-07-03 | End: 2025-07-03

## 2025-07-03 RX ADMIN — CEFUROXIME AXETIL 250 MG: 250 TABLET ORAL at 13:43

## 2025-07-03 ASSESSMENT — PAIN - FUNCTIONAL ASSESSMENT: PAIN_FUNCTIONAL_ASSESSMENT: NONE - DENIES PAIN

## 2025-07-03 ASSESSMENT — LIFESTYLE VARIABLES
HOW OFTEN DO YOU HAVE A DRINK CONTAINING ALCOHOL: NEVER
HOW MANY STANDARD DRINKS CONTAINING ALCOHOL DO YOU HAVE ON A TYPICAL DAY: PATIENT DOES NOT DRINK

## 2025-07-03 NOTE — ED TRIAGE NOTES
Pt arrived from [facility] for \"slurred speech and hyperglycemia\". Pt BS in triage is 208, no hx of diabetes. Pt is speaking clearly and a&Ox4. Pt denies any pain and does not know why she was sent into the ED. Pt is DNRCC.

## 2025-07-03 NOTE — ED PROVIDER NOTES
Chronic Conditions:   has a past medical history of Hypertension.    Records Reviewed: ED visit note on 5/6/2025 for TIA  Admission notes and discharge summary on 9/23/2024 for hypertensive urgency, multiple falls, injury to the head, acute cystitis and confusion      Disposition Considerations: Considered admitting the patient to the hospital but patient is DNR CC and had unremarkable workup and is at base of mentation therefore plan for discharge back to patient's nursing facility.  I am the Primary Clinician of Record.        FINAL IMPRESSION    1. Acute cystitis with hematuria    2. Hyperglycemia    3. Goals of care, counseling/discussion           DISPOSITION/PLAN:   DISPOSITION Decision To Discharge 07/03/2025 01:17:12 PM  Condition at Disposition: Stable      PATIENT REFERRED TO:   John Glasgow  [address] [place] [id number] [phone]    Call today  For a follow up appointment.       DISCHARGE MEDICATIONS:   New Prescriptions    No medications on file        DISCONTINUED MEDICATIONS:   Discontinued Medications    No medications on file              (Please note that portions of this note were completed with a voice recognition program.  Efforts were made to edit the dictations but occasionally words are mis-transcribed.)       Eitan Jenkins MD (electronically signed)              Eitan Jenkins MD  07/03/25 [id number]

## 2025-07-03 NOTE — DISCHARGE INSTRUCTIONS
Call today or tomorrow to follow up with John Glasgow  in 7 days.    Take your medication as indicated, if you are given an antibiotic then make sure you get the prescription filled and take the antibiotics until finished.  Drink plenty of fluids while taking the antibiotics.  Avoid drinking alcohol while taking antibiotics.     Use ibuprofen or Tylenol (unless prescribed medications that have Tylenol in it) for pain.  You can take over the counter Ibuprofen (advil) tablets (4 every 8 hours or 3 every 6 hours or 2 every 4 hours)    [employer], [facility] has some antibiotics for free; [facility] and [facility] has a 4 dollar prescription plan for some antibiotics.    Return to the Emergency Department for fever > 101.5, inability to urinate, burning when you urinate, increase in the number of times or if you have an urgency to urinate, any other care or concern.

## 2025-07-05 ENCOUNTER — RESULTS FOLLOW-UP (OUTPATIENT)
Dept: EMERGENCY DEPT | Age: 89
End: 2025-07-05

## 2025-07-05 LAB
BACTERIA UR CULT: ABNORMAL
ORGANISM: ABNORMAL

## (undated) DEVICE — SYRINGE IRRIG 60ML SFT PLIABLE BLB EZ TO GRP 1 HND USE W/

## (undated) DEVICE — T-DRAPE,EXTREMITY,STERILE: Brand: MEDLINE

## (undated) DEVICE — SOLUTION IV IRRIG POUR BRL 0.9% SODIUM CHL 2F7124

## (undated) DEVICE — 3M™ STERI-STRIP™ COMPOUND BENZOIN TINCTURE 40 BAGS/CARTON 4 CARTONS/CASE C1544: Brand: 3M™ STERI-STRIP™

## (undated) DEVICE — GLOVE SURG SZ 65 L12IN FNGR THK79MIL GRN LTX FREE

## (undated) DEVICE — OVERDRILL AO: Brand: VARIAX

## (undated) DEVICE — HAND AND ELBOW: Brand: MEDLINE INDUSTRIES, INC.

## (undated) DEVICE — DRILL, AO, SCALED: Brand: VARIAX

## (undated) DEVICE — GLOVE SURG SZ 6 L12IN FNGR THK79MIL GRN LTX FREE

## (undated) DEVICE — CANISTER, RIGID, 1200CC: Brand: MEDLINE INDUSTRIES, INC.

## (undated) DEVICE — DRESSING,GAUZE,XEROFORM,CURAD,1"X8",ST: Brand: CURAD

## (undated) DEVICE — STRIP,CLOSURE,WOUND,MEDI-STRIP,1/2X4: Brand: MEDLINE

## (undated) DEVICE — C-ARM PACK: Brand: C-ARM COVER

## (undated) DEVICE — UNTHREADED GUIDE WIRE: Brand: FIXOS

## (undated) DEVICE — ELECTRODE PT RET AD L9FT HI MOIST COND ADH HYDRGEL CORDED

## (undated) DEVICE — SPLINT ORTH W3XL12IN LAYERED FBRGLS FOAM PD BRTH BK MOLD

## (undated) DEVICE — GLOVE SURG SZ 65 THK91MIL LTX FREE SYN POLYISOPRENE

## (undated) DEVICE — GLOVE SURG SZ 8 CRM LTX FREE POLYISOPRENE POLYMER BEAD ANTI

## (undated) DEVICE — PENCIL ES L3M BTTN SWCH S STL HEX LOK BLDE ELECTRD HOLSTER

## (undated) DEVICE — SUTURE VCRL + SZ 3-0 L18IN ABSRB UD SH 1/2 CIR TAPERCUT NDL VCP864D

## (undated) DEVICE — LOCKING SCREW, FULLY THREADED,T8
Type: IMPLANTABLE DEVICE | Site: WRIST | Status: NON-FUNCTIONAL
Brand: VARIAX

## (undated) DEVICE — GOWN SIRUS NONREIN LG W/TWL: Brand: MEDLINE INDUSTRIES, INC.

## (undated) DEVICE — BANDAGE COMPR W4INXL15FT BGE E SGL LAYERED CLP CLSR